# Patient Record
Sex: FEMALE | Race: WHITE | NOT HISPANIC OR LATINO
[De-identification: names, ages, dates, MRNs, and addresses within clinical notes are randomized per-mention and may not be internally consistent; named-entity substitution may affect disease eponyms.]

---

## 2015-05-15 RX ORDER — LISINOPRIL 2.5 MG/1
1 TABLET ORAL
Qty: 0 | Refills: 0 | COMMUNITY
Start: 2015-05-15

## 2017-05-01 ENCOUNTER — APPOINTMENT (OUTPATIENT)
Dept: ORTHOPEDIC SURGERY | Facility: CLINIC | Age: 72
End: 2017-05-01

## 2017-05-01 VITALS — DIASTOLIC BLOOD PRESSURE: 85 MMHG | SYSTOLIC BLOOD PRESSURE: 157 MMHG | HEART RATE: 58 BPM

## 2017-05-01 VITALS — HEART RATE: 76 BPM | DIASTOLIC BLOOD PRESSURE: 92 MMHG | SYSTOLIC BLOOD PRESSURE: 193 MMHG

## 2017-05-01 DIAGNOSIS — Z86.79 PERSONAL HISTORY OF OTHER DISEASES OF THE CIRCULATORY SYSTEM: ICD-10-CM

## 2017-05-01 DIAGNOSIS — E78.00 PURE HYPERCHOLESTEROLEMIA, UNSPECIFIED: ICD-10-CM

## 2017-05-01 DIAGNOSIS — Z87.39 PERSONAL HISTORY OF OTHER DISEASES OF THE MUSCULOSKELETAL SYSTEM AND CONNECTIVE TISSUE: ICD-10-CM

## 2017-05-01 RX ORDER — ALPRAZOLAM 2 MG/1
TABLET ORAL
Refills: 0 | Status: ACTIVE | COMMUNITY

## 2017-05-22 ENCOUNTER — APPOINTMENT (OUTPATIENT)
Dept: ORTHOPEDIC SURGERY | Facility: CLINIC | Age: 72
End: 2017-05-22

## 2017-05-22 VITALS
WEIGHT: 145 LBS | SYSTOLIC BLOOD PRESSURE: 165 MMHG | BODY MASS INDEX: 25.69 KG/M2 | HEIGHT: 63 IN | HEART RATE: 68 BPM | DIASTOLIC BLOOD PRESSURE: 95 MMHG

## 2017-06-12 ENCOUNTER — APPOINTMENT (OUTPATIENT)
Dept: ORTHOPEDIC SURGERY | Facility: CLINIC | Age: 72
End: 2017-06-12

## 2018-05-07 ENCOUNTER — EMERGENCY (EMERGENCY)
Facility: HOSPITAL | Age: 73
LOS: 1 days | Discharge: HOME | End: 2018-05-07
Attending: EMERGENCY MEDICINE | Admitting: EMERGENCY MEDICINE
Payer: MEDICARE

## 2018-05-07 VITALS
SYSTOLIC BLOOD PRESSURE: 131 MMHG | OXYGEN SATURATION: 99 % | RESPIRATION RATE: 18 BRPM | DIASTOLIC BLOOD PRESSURE: 77 MMHG | TEMPERATURE: 98 F | HEART RATE: 90 BPM | WEIGHT: 160.06 LBS

## 2018-05-07 DIAGNOSIS — Z98.89 OTHER SPECIFIED POSTPROCEDURAL STATES: Chronic | ICD-10-CM

## 2018-05-07 PROCEDURE — 99284 EMERGENCY DEPT VISIT MOD MDM: CPT | Mod: 25

## 2018-05-07 PROCEDURE — 70450 CT HEAD/BRAIN W/O DYE: CPT

## 2018-05-07 PROCEDURE — 70450 CT HEAD/BRAIN W/O DYE: CPT | Mod: 26

## 2018-05-07 PROCEDURE — 90715 TDAP VACCINE 7 YRS/> IM: CPT

## 2018-05-07 PROCEDURE — 12001 RPR S/N/AX/GEN/TRNK 2.5CM/<: CPT | Mod: GC

## 2018-05-07 PROCEDURE — 90471 IMMUNIZATION ADMIN: CPT

## 2018-05-07 PROCEDURE — 12001 RPR S/N/AX/GEN/TRNK 2.5CM/<: CPT

## 2018-05-07 RX ORDER — TETANUS TOXOID, REDUCED DIPHTHERIA TOXOID AND ACELLULAR PERTUSSIS VACCINE, ADSORBED 5; 2.5; 8; 8; 2.5 [IU]/.5ML; [IU]/.5ML; UG/.5ML; UG/.5ML; UG/.5ML
0.5 SUSPENSION INTRAMUSCULAR ONCE
Qty: 0 | Refills: 0 | Status: COMPLETED | OUTPATIENT
Start: 2018-05-07 | End: 2018-05-07

## 2018-05-07 RX ADMIN — TETANUS TOXOID, REDUCED DIPHTHERIA TOXOID AND ACELLULAR PERTUSSIS VACCINE, ADSORBED 0.5 MILLILITER(S): 5; 2.5; 8; 8; 2.5 SUSPENSION INTRAMUSCULAR at 12:07

## 2018-05-07 NOTE — ED ADULT NURSE NOTE - OBJECTIVE STATEMENT
Pt presented for eval of lac to right side of head after trip and fall, no loc.  Wound has been stapled by ED staff.

## 2018-05-07 NOTE — ED PROVIDER NOTE - MEDICAL DECISION MAKING DETAILS
Attending MD Terrazas: 72F with mechanical fall with head strike, +1cm laceration to right parietal scalp, Cervical spine cleared clinically of fracture without need for imaging according to Nexus Criteria, no other trauma. PLan for CT head to r/o ICH given advanced age, update tetanus and repair laceration of scalp primarily

## 2018-05-07 NOTE — ED PROCEDURE NOTE - ATTENDING CONTRIBUTION TO CARE
Attending MD Terrazas: Risks, benefit and alternatives of procedure explained to patient and patient demonstrated verbal understanding and consent.  I was present during the key portions of the procedure.

## 2018-05-07 NOTE — ED PROVIDER NOTE - OBJECTIVE STATEMENT
72F presenting with fall from standing, patient has a drop foot and states she falls on occasion. +head strike with no LOC. She denies any neck pain, back pain, chest pain or extremity pain. +lac to scalp that is oozing upon arrival. Unknown last tetanus

## 2018-05-07 NOTE — ED PROVIDER NOTE - PLAN OF CARE
You had ****** staples placed, this need to be removed in 7 days.    Keep the wound dry for 24 hours, then after this you may let water run over the wound but do not scrub it. Observe for signs of infection including spreading redness around the wound, fevers (temperature over 101F), or discharge of pus from the area. Return to the emergency department if these occur You had 2 staples placed, this need to be removed in 7 days.    Keep the wound dry for 24 hours, then after this you may let water run over the wound but do not scrub it. Observe for signs of infection including spreading redness around the wound, fevers (temperature over 101F), or discharge of pus from the area. Return to the emergency department if these occur

## 2018-05-07 NOTE — ED PROVIDER NOTE - CARE PLAN
Principal Discharge DX:	Scalp laceration, initial encounter  Assessment and plan of treatment:	You had ****** staples placed, this need to be removed in 7 days.    Keep the wound dry for 24 hours, then after this you may let water run over the wound but do not scrub it. Observe for signs of infection including spreading redness around the wound, fevers (temperature over 101F), or discharge of pus from the area. Return to the emergency department if these occur  Secondary Diagnosis:	Fall, initial encounter Principal Discharge DX:	Scalp laceration, initial encounter  Assessment and plan of treatment:	You had 2 staples placed, this need to be removed in 7 days.    Keep the wound dry for 24 hours, then after this you may let water run over the wound but do not scrub it. Observe for signs of infection including spreading redness around the wound, fevers (temperature over 101F), or discharge of pus from the area. Return to the emergency department if these occur  Secondary Diagnosis:	Fall, initial encounter

## 2018-05-07 NOTE — ED PROVIDER NOTE - PHYSICAL EXAMINATION
Attending MD Terrazas: A & O x 3, GCS 15, NAD, +1cm linear laceration to right parietal scalp, HEENT WNL and no facial asymmetry;  nontender spine; lungs CTAB with no chest wall trauma or TTP, heart with reg rhythm without murmur; abdomen soft NTND with no R/G; extremities with no edema/deformities; skin with no rashes, neuro exam non focal with no motor or sensory deficits and patient is moving all extremities spontaneously.

## 2018-05-25 ENCOUNTER — INPATIENT (INPATIENT)
Facility: HOSPITAL | Age: 73
LOS: 3 days | Discharge: HOME | DRG: 916 | End: 2018-05-29
Attending: INTERNAL MEDICINE | Admitting: INTERNAL MEDICINE
Payer: MEDICARE

## 2018-05-25 VITALS
OXYGEN SATURATION: 92 % | HEART RATE: 108 BPM | DIASTOLIC BLOOD PRESSURE: 72 MMHG | SYSTOLIC BLOOD PRESSURE: 122 MMHG | TEMPERATURE: 99 F | RESPIRATION RATE: 18 BRPM

## 2018-05-25 DIAGNOSIS — Z98.89 OTHER SPECIFIED POSTPROCEDURAL STATES: Chronic | ICD-10-CM

## 2018-05-25 DIAGNOSIS — N39.0 URINARY TRACT INFECTION, SITE NOT SPECIFIED: ICD-10-CM

## 2018-05-25 LAB
ALBUMIN SERPL ELPH-MCNC: 3.7 G/DL — SIGNIFICANT CHANGE UP (ref 3.3–5)
ALP SERPL-CCNC: 176 U/L — HIGH (ref 40–120)
ALT FLD-CCNC: 33 U/L — SIGNIFICANT CHANGE UP (ref 10–45)
ANION GAP SERPL CALC-SCNC: 14 MMOL/L — SIGNIFICANT CHANGE UP (ref 5–17)
APPEARANCE UR: ABNORMAL
AST SERPL-CCNC: 23 U/L — SIGNIFICANT CHANGE UP (ref 10–40)
BASE EXCESS BLDV CALC-SCNC: -1.8 MMOL/L — SIGNIFICANT CHANGE UP (ref -2–2)
BASOPHILS # BLD AUTO: 0 K/UL — SIGNIFICANT CHANGE UP (ref 0–0.2)
BASOPHILS NFR BLD AUTO: 0.2 % — SIGNIFICANT CHANGE UP (ref 0–2)
BILIRUB SERPL-MCNC: 0.6 MG/DL — SIGNIFICANT CHANGE UP (ref 0.2–1.2)
BILIRUB UR-MCNC: NEGATIVE — SIGNIFICANT CHANGE UP
BUN SERPL-MCNC: 34 MG/DL — HIGH (ref 7–23)
CA-I SERPL-SCNC: 1.21 MMOL/L — SIGNIFICANT CHANGE UP (ref 1.12–1.3)
CALCIUM SERPL-MCNC: 10.3 MG/DL — SIGNIFICANT CHANGE UP (ref 8.4–10.5)
CHLORIDE BLDV-SCNC: 104 MMOL/L — SIGNIFICANT CHANGE UP (ref 96–108)
CHLORIDE SERPL-SCNC: 97 MMOL/L — SIGNIFICANT CHANGE UP (ref 96–108)
CO2 BLDV-SCNC: 22 MMOL/L — SIGNIFICANT CHANGE UP (ref 22–30)
CO2 SERPL-SCNC: 21 MMOL/L — LOW (ref 22–31)
COLOR SPEC: YELLOW — SIGNIFICANT CHANGE UP
CREAT SERPL-MCNC: 0.93 MG/DL — SIGNIFICANT CHANGE UP (ref 0.5–1.3)
DIFF PNL FLD: NEGATIVE — SIGNIFICANT CHANGE UP
EOSINOPHIL # BLD AUTO: 0 K/UL — SIGNIFICANT CHANGE UP (ref 0–0.5)
EOSINOPHIL NFR BLD AUTO: 0.2 % — SIGNIFICANT CHANGE UP (ref 0–6)
EPI CELLS # UR: SIGNIFICANT CHANGE UP /HPF
GAS PNL BLDV: 127 MMOL/L — LOW (ref 136–145)
GAS PNL BLDV: SIGNIFICANT CHANGE UP
GAS PNL BLDV: SIGNIFICANT CHANGE UP
GLUCOSE BLDV-MCNC: 129 MG/DL — HIGH (ref 70–99)
GLUCOSE SERPL-MCNC: 125 MG/DL — HIGH (ref 70–99)
GLUCOSE UR QL: NEGATIVE — SIGNIFICANT CHANGE UP
HCO3 BLDV-SCNC: 21 MMOL/L — SIGNIFICANT CHANGE UP (ref 21–29)
HCT VFR BLD CALC: 42.8 % — SIGNIFICANT CHANGE UP (ref 34.5–45)
HCT VFR BLDA CALC: 42 % — SIGNIFICANT CHANGE UP (ref 39–50)
HGB BLD CALC-MCNC: 13.6 G/DL — SIGNIFICANT CHANGE UP (ref 11.5–15.5)
HGB BLD-MCNC: 14 G/DL — SIGNIFICANT CHANGE UP (ref 11.5–15.5)
KETONES UR-MCNC: NEGATIVE — SIGNIFICANT CHANGE UP
LACTATE BLDV-MCNC: 3 MMOL/L — HIGH (ref 0.7–2)
LEUKOCYTE ESTERASE UR-ACNC: ABNORMAL
LYMPHOCYTES # BLD AUTO: 1.7 K/UL — SIGNIFICANT CHANGE UP (ref 1–3.3)
LYMPHOCYTES # BLD AUTO: 11.3 % — LOW (ref 13–44)
MCHC RBC-ENTMCNC: 27.4 PG — SIGNIFICANT CHANGE UP (ref 27–34)
MCHC RBC-ENTMCNC: 32.6 GM/DL — SIGNIFICANT CHANGE UP (ref 32–36)
MCV RBC AUTO: 83.9 FL — SIGNIFICANT CHANGE UP (ref 80–100)
MONOCYTES # BLD AUTO: 0.7 K/UL — SIGNIFICANT CHANGE UP (ref 0–0.9)
MONOCYTES NFR BLD AUTO: 5.1 % — SIGNIFICANT CHANGE UP (ref 2–14)
NEUTROPHILS # BLD AUTO: 12.2 K/UL — HIGH (ref 1.8–7.4)
NEUTROPHILS NFR BLD AUTO: 83.2 % — HIGH (ref 43–77)
NITRITE UR-MCNC: NEGATIVE — SIGNIFICANT CHANGE UP
PCO2 BLDV: 31 MMHG — LOW (ref 35–50)
PH BLDV: 7.44 — SIGNIFICANT CHANGE UP (ref 7.35–7.45)
PH UR: 6.5 — SIGNIFICANT CHANGE UP (ref 5–8)
PLATELET # BLD AUTO: 378 K/UL — SIGNIFICANT CHANGE UP (ref 150–400)
PO2 BLDV: 41 MMHG — SIGNIFICANT CHANGE UP (ref 25–45)
POTASSIUM BLDV-SCNC: 4.9 MMOL/L — SIGNIFICANT CHANGE UP (ref 3.5–5)
POTASSIUM SERPL-MCNC: 4 MMOL/L — SIGNIFICANT CHANGE UP (ref 3.5–5.3)
POTASSIUM SERPL-SCNC: 4 MMOL/L — SIGNIFICANT CHANGE UP (ref 3.5–5.3)
PROT SERPL-MCNC: 6.4 G/DL — SIGNIFICANT CHANGE UP (ref 6–8.3)
PROT UR-MCNC: 100 MG/DL
RBC # BLD: 5.1 M/UL — SIGNIFICANT CHANGE UP (ref 3.8–5.2)
RBC # FLD: 12.1 % — SIGNIFICANT CHANGE UP (ref 10.3–14.5)
RBC CASTS # UR COMP ASSIST: SIGNIFICANT CHANGE UP /HPF (ref 0–2)
SAO2 % BLDV: 76 % — SIGNIFICANT CHANGE UP (ref 67–88)
SODIUM SERPL-SCNC: 132 MMOL/L — LOW (ref 135–145)
SP GR SPEC: >1.03 — HIGH (ref 1.01–1.02)
UROBILINOGEN FLD QL: NEGATIVE — SIGNIFICANT CHANGE UP
WBC # BLD: 14.6 K/UL — HIGH (ref 3.8–10.5)
WBC # FLD AUTO: 14.6 K/UL — HIGH (ref 3.8–10.5)
WBC UR QL: SIGNIFICANT CHANGE UP /HPF (ref 0–5)

## 2018-05-25 PROCEDURE — 93010 ELECTROCARDIOGRAM REPORT: CPT

## 2018-05-25 PROCEDURE — 99285 EMERGENCY DEPT VISIT HI MDM: CPT | Mod: 25,GC

## 2018-05-25 RX ORDER — LISINOPRIL 2.5 MG/1
20 TABLET ORAL
Qty: 0 | Refills: 0 | Status: DISCONTINUED | OUTPATIENT
Start: 2018-05-25 | End: 2018-05-26

## 2018-05-25 RX ORDER — CEFTRIAXONE 500 MG/1
1 INJECTION, POWDER, FOR SOLUTION INTRAMUSCULAR; INTRAVENOUS ONCE
Qty: 0 | Refills: 0 | Status: COMPLETED | OUTPATIENT
Start: 2018-05-25 | End: 2018-05-25

## 2018-05-25 RX ORDER — DILTIAZEM HCL 120 MG
300 CAPSULE, EXT RELEASE 24 HR ORAL DAILY
Qty: 0 | Refills: 0 | Status: DISCONTINUED | OUTPATIENT
Start: 2018-05-25 | End: 2018-05-28

## 2018-05-25 RX ORDER — DILTIAZEM HCL 120 MG
180 CAPSULE, EXT RELEASE 24 HR ORAL DAILY
Qty: 0 | Refills: 0 | Status: DISCONTINUED | OUTPATIENT
Start: 2018-05-25 | End: 2018-05-25

## 2018-05-25 RX ORDER — SODIUM CHLORIDE 9 MG/ML
1000 INJECTION INTRAMUSCULAR; INTRAVENOUS; SUBCUTANEOUS
Qty: 0 | Refills: 0 | Status: DISCONTINUED | OUTPATIENT
Start: 2018-05-25 | End: 2018-05-27

## 2018-05-25 RX ORDER — SODIUM CHLORIDE 9 MG/ML
2000 INJECTION INTRAMUSCULAR; INTRAVENOUS; SUBCUTANEOUS ONCE
Qty: 0 | Refills: 0 | Status: COMPLETED | OUTPATIENT
Start: 2018-05-25 | End: 2018-05-25

## 2018-05-25 RX ORDER — ALPRAZOLAM 0.25 MG
0.25 TABLET ORAL ONCE
Qty: 0 | Refills: 0 | Status: DISCONTINUED | OUTPATIENT
Start: 2018-05-25 | End: 2018-05-26

## 2018-05-25 RX ORDER — DIPHENHYDRAMINE HCL 50 MG
25 CAPSULE ORAL ONCE
Qty: 0 | Refills: 0 | Status: COMPLETED | OUTPATIENT
Start: 2018-05-25 | End: 2018-05-25

## 2018-05-25 RX ORDER — ISOSORBIDE MONONITRATE 60 MG/1
60 TABLET, EXTENDED RELEASE ORAL DAILY
Qty: 0 | Refills: 0 | Status: DISCONTINUED | OUTPATIENT
Start: 2018-05-25 | End: 2018-05-26

## 2018-05-25 RX ADMIN — CEFTRIAXONE 100 GRAM(S): 500 INJECTION, POWDER, FOR SOLUTION INTRAMUSCULAR; INTRAVENOUS at 23:47

## 2018-05-25 RX ADMIN — SODIUM CHLORIDE 70 MILLILITER(S): 9 INJECTION INTRAMUSCULAR; INTRAVENOUS; SUBCUTANEOUS at 22:31

## 2018-05-25 NOTE — ED PROVIDER NOTE - OBJECTIVE STATEMENT
73 year old female, past medical history HTN, anxiety, presents to the ED for fever. Patient reports that she woke up with rash on arms and hands, was pruritic, slight improvement with benadryl. Went to urgent care was noted to be febrile, and had positive UA. Was advised to come to the ED for further workup. Was also noted to be tachycardic. Patient now reports no pain, dysuria, frequency. No chest pain, shortness of breath, abdominal pain, back pain. No sick contacts or recent travel. No new medications.

## 2018-05-25 NOTE — H&P ADULT - ASSESSMENT
pt with known hx of htn, who presented with fever,chills,rash   r/o urosepsis  abx  dvt prophylaxis  possible allergy rx ?to meal last night no sign of anaphylaxis  benadryll,zantac  ivf  will follow up

## 2018-05-25 NOTE — ED PROVIDER NOTE - CHIEF COMPLAINT
The patient is a 73y Female complaining of urinary symptoms. The patient is a 73y Female complaining of fever.

## 2018-05-25 NOTE — ED ADULT NURSE NOTE - OBJECTIVE STATEMENT
Presents today for dx UTI/fever. Pt awoke with fever and itchy rash to arms and trunk. Pt went to urgent care and was diagnosed with UTI. Denies urinary symptoms. Pt A&Ox3. [place]. RUBIO. Ambulates with cane. Denies cp/sob. Respirations even/unlabored. Abd soft. No n/v/d. redness noted to bilat arms. +itching.

## 2018-05-25 NOTE — H&P ADULT - HISTORY OF PRESENT ILLNESS
CHIEF COMPLAINT:Patient is a 73y old  Female who presents with a chief complaint of fever,chill,rash.    HPI: pt is a 74 yo female with hx of htn, anxiety disorder who presented to er with c/o fever ,chills, rash starting 4 am .  pt seen in urgent care with positive u/a and fever of 102 and tachycardic.  no chest pain,sob,dizziness.      PAST MEDICAL & SURGICAL HISTORY:  Fall  Passed out  Fracture of femur  Hypertension  S/P ORIF (open reduction internal fixation) fracture      MEDICATIONS  (STANDING):    MEDICATIONS  (PRN):      FAMILY HISTORY:      SOCIAL HISTORY:    [ ] Non-smoker  [ ] Smoker  [ ] Alcohol    Allergies    hydrALAZINE (Rash)    Intolerances    	    REVIEW OF SYSTEMS:  CONSTITUTIONAL: No fever, weight loss, or fatigue  EYES: No eye pain, visual disturbances, or discharge, fever  ENT:  No difficulty hearing, tinnitus, vertigo; No sinus or throat pain  NECK: No pain or stiffness  RESPIRATORY: No cough, wheezing, chills or hemoptysis; No Shortness of Breath  CARDIOVASCULAR: No chest pain, + palpitations,no  passing out, dizziness, or leg swelling  GASTROINTESTINAL: No abdominal or epigastric pain. No nausea, vomiting, or hematemesis; No diarrhea or constipation. No melena or hematochezia.  GENITOURINARY: No dysuria, frequency, hematuria, or incontinence  NEUROLOGICAL: No headaches, memory loss, loss of strength, numbness, or tremors  SKIN: No itching, burning, rashes, or lesions   LYMPH Nodes: No enlarged glands  ENDOCRINE: No heat or cold intolerance; No hair loss  MUSCULOSKELETAL: No joint pain or swelling; No muscle, back, or extremity pain  PSYCHIATRIC: No depression, anxiety, mood swings, or difficulty sleeping  HEME/LYMPH: No easy bruising, or bleeding gums  ALLERGY AND IMMUNOLOGIC: No hives or eczema	    [ ] All others negative	  [ ] Unable to obtain    PHYSICAL EXAM:  T(C): 37.2 (05-25-18 @ 19:46), Max: 37.2 (05-25-18 @ 19:46)  HR: 108 (05-25-18 @ 19:46) (108 - 108)  BP: 122/72 (05-25-18 @ 19:46) (122/72 - 122/72)  RR: 18 (05-25-18 @ 19:46) (18 - 18)  SpO2: 92% (05-25-18 @ 19:46) (92% - 92%)  Wt(kg): --  I&O's Summary      Appearance: Normal	  HEENT:   Normal oral mucosa, PERRL, EOMI, + rash	  Lymphatic: No lymphadenopathy  Cardiovascular: Normal S1 S2, No JVD, +murmurs, No edema  Respiratory: Lungs clear to auscultation	  Psychiatry: A & O x 3, Mood & affect appropriate  Gastrointestinal:  Soft, Non-tender, + BS	  Skin: No rashes, No ecchymoses, No cyanosis	  Neurologic: Non-focal  Extremities: Normal range of motion, No clubbing, cyanosis or edema  Vascular: Peripheral pulses palpable 2+ bilaterally    TELEMETRY: 	    ECG:  	  RADIOLOGY:  OTHER: 	  	  LABS:	 	    CARDIAC MARKERS:                proBNP:   Lipid Profile:   HgA1c:   TSH:       PREVIOUS DIAGNOSTIC TESTING:    [ ] Echocardiogram:  [ ]  Catheterization:  [ ] Stress Test:

## 2018-05-25 NOTE — ED PROVIDER NOTE - ATTENDING CONTRIBUTION TO CARE
pt is a 72 yo female with hx of htn, anxiety disorder who presented to er with c/o fever ,chills, rash starting 4 am .  pt seen in urgent care with positive u/a and fever of 102 and tachycardic. vss to work up for urosepsis, ivf, labs, abx.

## 2018-05-26 LAB
ANION GAP SERPL CALC-SCNC: 12 MMOL/L — SIGNIFICANT CHANGE UP (ref 5–17)
BUN SERPL-MCNC: 27 MG/DL — HIGH (ref 7–23)
CALCIUM SERPL-MCNC: 8.7 MG/DL — SIGNIFICANT CHANGE UP (ref 8.4–10.5)
CHLORIDE SERPL-SCNC: 104 MMOL/L — SIGNIFICANT CHANGE UP (ref 96–108)
CO2 SERPL-SCNC: 19 MMOL/L — LOW (ref 22–31)
CREAT SERPL-MCNC: 0.75 MG/DL — SIGNIFICANT CHANGE UP (ref 0.5–1.3)
CULTURE RESULTS: SIGNIFICANT CHANGE UP
GLUCOSE SERPL-MCNC: 125 MG/DL — HIGH (ref 70–99)
HCT VFR BLD CALC: 38.5 % — SIGNIFICANT CHANGE UP (ref 34.5–45)
HGB BLD-MCNC: 12.4 G/DL — SIGNIFICANT CHANGE UP (ref 11.5–15.5)
MCHC RBC-ENTMCNC: 26.8 PG — LOW (ref 27–34)
MCHC RBC-ENTMCNC: 32.2 GM/DL — SIGNIFICANT CHANGE UP (ref 32–36)
MCV RBC AUTO: 83.3 FL — SIGNIFICANT CHANGE UP (ref 80–100)
PLATELET # BLD AUTO: 323 K/UL — SIGNIFICANT CHANGE UP (ref 150–400)
POTASSIUM SERPL-MCNC: 4.2 MMOL/L — SIGNIFICANT CHANGE UP (ref 3.5–5.3)
POTASSIUM SERPL-SCNC: 4.2 MMOL/L — SIGNIFICANT CHANGE UP (ref 3.5–5.3)
RAPID RVP RESULT: SIGNIFICANT CHANGE UP
RBC # BLD: 4.62 M/UL — SIGNIFICANT CHANGE UP (ref 3.8–5.2)
RBC # FLD: 13.8 % — SIGNIFICANT CHANGE UP (ref 10.3–14.5)
SODIUM SERPL-SCNC: 135 MMOL/L — SIGNIFICANT CHANGE UP (ref 135–145)
SPECIMEN SOURCE: SIGNIFICANT CHANGE UP
TSH SERPL-MCNC: 2.59 UIU/ML — SIGNIFICANT CHANGE UP (ref 0.27–4.2)
WBC # BLD: 10.99 K/UL — HIGH (ref 3.8–10.5)
WBC # FLD AUTO: 10.99 K/UL — HIGH (ref 3.8–10.5)

## 2018-05-26 PROCEDURE — 99223 1ST HOSP IP/OBS HIGH 75: CPT

## 2018-05-26 RX ORDER — ONDANSETRON 8 MG/1
4 TABLET, FILM COATED ORAL ONCE
Qty: 0 | Refills: 0 | Status: DISCONTINUED | OUTPATIENT
Start: 2018-05-26 | End: 2018-05-29

## 2018-05-26 RX ORDER — HYDROCORTISONE 20 MG
50 TABLET ORAL
Qty: 0 | Refills: 0 | Status: DISCONTINUED | OUTPATIENT
Start: 2018-05-26 | End: 2018-05-29

## 2018-05-26 RX ORDER — OXYCODONE AND ACETAMINOPHEN 5; 325 MG/1; MG/1
1 TABLET ORAL EVERY 12 HOURS
Qty: 0 | Refills: 0 | Status: DISCONTINUED | OUTPATIENT
Start: 2018-05-26 | End: 2018-05-29

## 2018-05-26 RX ORDER — HYDROXYZINE HCL 10 MG
25 TABLET ORAL
Qty: 0 | Refills: 0 | Status: DISCONTINUED | OUTPATIENT
Start: 2018-05-26 | End: 2018-05-26

## 2018-05-26 RX ORDER — HYDROXYZINE HCL 10 MG
25 TABLET ORAL ONCE
Qty: 0 | Refills: 0 | Status: COMPLETED | OUTPATIENT
Start: 2018-05-26 | End: 2018-05-26

## 2018-05-26 RX ORDER — CEFTRIAXONE 500 MG/1
1 INJECTION, POWDER, FOR SOLUTION INTRAMUSCULAR; INTRAVENOUS EVERY 24 HOURS
Qty: 0 | Refills: 0 | Status: DISCONTINUED | OUTPATIENT
Start: 2018-05-26 | End: 2018-05-26

## 2018-05-26 RX ORDER — FLUOCINONIDE/EMOLLIENT BASE 0.05 %
1 CREAM (GRAM) TOPICAL
Qty: 0 | Refills: 0 | Status: DISCONTINUED | OUTPATIENT
Start: 2018-05-26 | End: 2018-05-29

## 2018-05-26 RX ORDER — HYDROXYZINE HCL 10 MG
25 TABLET ORAL AT BEDTIME
Qty: 0 | Refills: 0 | Status: DISCONTINUED | OUTPATIENT
Start: 2018-05-26 | End: 2018-05-29

## 2018-05-26 RX ORDER — LISINOPRIL 2.5 MG/1
10 TABLET ORAL AT BEDTIME
Qty: 0 | Refills: 0 | Status: DISCONTINUED | OUTPATIENT
Start: 2018-05-26 | End: 2018-05-26

## 2018-05-26 RX ORDER — LORATADINE 10 MG/1
10 TABLET ORAL ONCE
Qty: 0 | Refills: 0 | Status: COMPLETED | OUTPATIENT
Start: 2018-05-26 | End: 2018-05-26

## 2018-05-26 RX ORDER — LISINOPRIL 2.5 MG/1
20 TABLET ORAL DAILY
Qty: 0 | Refills: 0 | Status: DISCONTINUED | OUTPATIENT
Start: 2018-05-26 | End: 2018-05-26

## 2018-05-26 RX ORDER — ENOXAPARIN SODIUM 100 MG/ML
40 INJECTION SUBCUTANEOUS DAILY
Qty: 0 | Refills: 0 | Status: DISCONTINUED | OUTPATIENT
Start: 2018-05-26 | End: 2018-05-29

## 2018-05-26 RX ORDER — DIPHENHYDRAMINE HCL 50 MG
25 CAPSULE ORAL ONCE
Qty: 0 | Refills: 0 | Status: COMPLETED | OUTPATIENT
Start: 2018-05-26 | End: 2018-05-26

## 2018-05-26 RX ORDER — ACETAMINOPHEN 500 MG
650 TABLET ORAL EVERY 6 HOURS
Qty: 0 | Refills: 0 | Status: DISCONTINUED | OUTPATIENT
Start: 2018-05-26 | End: 2018-05-29

## 2018-05-26 RX ORDER — LORATADINE 10 MG/1
10 TABLET ORAL
Qty: 0 | Refills: 0 | Status: DISCONTINUED | OUTPATIENT
Start: 2018-05-26 | End: 2018-05-29

## 2018-05-26 RX ADMIN — OXYCODONE AND ACETAMINOPHEN 1 TABLET(S): 5; 325 TABLET ORAL at 19:24

## 2018-05-26 RX ADMIN — Medication 1 APPLICATION(S): at 17:16

## 2018-05-26 RX ADMIN — Medication 300 MILLIGRAM(S): at 11:35

## 2018-05-26 RX ADMIN — LISINOPRIL 20 MILLIGRAM(S): 2.5 TABLET ORAL at 05:57

## 2018-05-26 RX ADMIN — OXYCODONE AND ACETAMINOPHEN 1 TABLET(S): 5; 325 TABLET ORAL at 17:20

## 2018-05-26 RX ADMIN — LORATADINE 10 MILLIGRAM(S): 10 TABLET ORAL at 17:13

## 2018-05-26 RX ADMIN — Medication 650 MILLIGRAM(S): at 17:51

## 2018-05-26 RX ADMIN — Medication 25 MILLIGRAM(S): at 01:00

## 2018-05-26 RX ADMIN — Medication 25 MILLIGRAM(S): at 11:31

## 2018-05-26 RX ADMIN — SODIUM CHLORIDE 2000 MILLILITER(S): 9 INJECTION INTRAMUSCULAR; INTRAVENOUS; SUBCUTANEOUS at 00:58

## 2018-05-26 RX ADMIN — Medication 0.25 MILLIGRAM(S): at 11:38

## 2018-05-26 RX ADMIN — ENOXAPARIN SODIUM 40 MILLIGRAM(S): 100 INJECTION SUBCUTANEOUS at 11:38

## 2018-05-26 RX ADMIN — Medication 25 MILLIGRAM(S): at 22:39

## 2018-05-26 RX ADMIN — Medication 50 MILLIGRAM(S): at 17:10

## 2018-05-26 RX ADMIN — SODIUM CHLORIDE 100 MILLILITER(S): 9 INJECTION INTRAMUSCULAR; INTRAVENOUS; SUBCUTANEOUS at 22:39

## 2018-05-26 NOTE — CONSULT NOTE ADULT - SUBJECTIVE AND OBJECTIVE BOX
International Travel? No(1)    73y Female complaining of fever.	  	   73 year ,  HTN, anxiety, hip  surg,  falls,, hyponatremia presents to the ED for fever. Patient reports that she woke up with rash on arms and hands, was pruritic, slight improvement with benadryl. Went to urgent care was noted to be febrile, and had positive UA. Was advised to come to the ED for further workup. Was also noted to be tachycardic. Patient now reports no pain, dysuria, frequency. No chest pain, shortness of breath, abdominal pain, back pain. No sick contacts or recent travel. No new medication	     HPI:  CHIEF COMPLAINT:Patient is a 73y old  Female who presents with a chief complaint of fever,chill,rash.    PAST MEDICAL & SURGICAL HISTORY:  Fall  Passed out  Fracture of femur  Hypertension  S/P ORIF (open reduction internal fixation) fracture      MEDICATIONS  (STANDING):    MEDICATIONS  (PRN):      FAMILY HISTORY:      SOCIAL HISTORY:    [ ] Non-smoker  [ ] Smoker  [ ] Alcohol    Allergies    hydrALAZINE (Rash)    Intolerances    	    REVIEW OF SYSTEMS:  CONSTITUTIONAL: fever,  no  weight loss, or fatigue  EYES: No eye pain, visual disturbances, or discharge, fever  ENT:  No difficulty hearing, tinnitus, vertigo; No sinus or throat pain  NECK: No pain or stiffness  RESPIRATORY: No cough, wheezing, chills or hemoptysis; No Shortness of Breath  CARDIOVASCULAR: No chest pain, + palpitations,no  passing out, dizziness, or leg swelling  GASTROINTESTINAL: No abdominal or epigastric pain. No nausea, vomiting, or hematemesis; No diarrhea or constipation. No melena or hematochezia.  GENITOURINARY: No dysuria, frequency, hematuria, or incontinence  NEUROLOGICAL: No headaches, memory loss, loss of strength, numbness, or tremors  SKIN: No itching, burning, rashes, or lesions   LYMPH Nodes: No enlarged glands  ENDOCRINE: No heat or cold intolerance; No hair loss  MUSCULOSKELETAL: No joint pain or swelling; No muscle, back, or extremity pain  PSYCHIATRIC: No depression, anxiety, mood swings, or difficulty sleeping  HEME/LYMPH: No easy bruising, or bleeding gums  ALLERGY AND IMMUNOLOGIC: No hives or eczema	    [ ] All others negative	  [ ] Unable to obtain    PHYSICAL EXAM:  T(C): 37.2 (05-25-18 @ 19:46), Max: 37.2 (05-25-18 @ 19:46)  HR: 108 (05-25-18 @ 19:46) (108 - 108)  BP: 122/72 (05-25-18 @ 19:46) (122/72 - 122/72)  RR: 18 (05-25-18 @ 19:46) (18 - 18)  SpO2: 92% (05-25-18 @ 19:46) (92% - 92%)  Wt(kg): --  I&O's Summary      Appearance: Normal	  HEENT:   Normal oral mucosa, PERRL, EOMI, + rash	  Lymphatic: No lymphadenopathy  Cardiovascular: Normal S1 S2, No JVD, +murmurs, No edema  Respiratory: Lungs clear to auscultation	  Psychiatry: A & O x 3, Mood & affect appropriate  Gastrointestinal:  Soft, Non-tender, + BS	  Skin: No rashes, No ecchymoses, No cyanosis	  Neurologic: Non-focal  Extremities: Normal range of motion, No clubbing, cyanosis or edema  Vascular: Peripheral pulses palpable 2+ bilaterally    TELEMETRY: 	    ECG:  	  RADIOLOGY:  OTHER: 	  	  LABS:	 	    CARDIAC MARKERS:                proBNP:   Lipid Profile:   HgA1c:   TSH:       PREVIOUS DIAGNOSTIC TESTING:    [ ] Echocardiogram:  [ ]  Catheterization:  [ ] Stress Test: (25 May 2018 21:15)      REVIEW OF SYSTEMS:  CONSTITUTIONAL: No weakness,  no   fevers      RESPIRATORY:  No    shortness of breath  , no  wheezing  CARDIOVASCULAR: No chest pain,   no  palpitations, no  cough  GASTROINTESTINAL: No abdominal  pain, no  vomiting, no  diarrhea  NEUROLOGICAL: No  focal  weakness    Allergies    hydrALAZINE (Rash)  Lexapro (Other)    Intolerances        CAPILLARY BLOOD GLUCOSE        I&O's Summary    25 May 2018 07:01  -  26 May 2018 07:00  --------------------------------------------------------  IN: 350 mL / OUT: 0 mL / NET: 350 mL        Vital Signs Last 24 Hrs  T(C): 37.2 (26 May 2018 05:46), Max: 37.2 (25 May 2018 19:46)  T(F): 98.9 (26 May 2018 05:46), Max: 98.9 (25 May 2018 19:46)  HR: 118 (26 May 2018 05:46) (101 - 118)  BP: 107/52 (26 May 2018 05:46) (101/67 - 137/72)  BP(mean): --  RR: 20 (26 May 2018 05:46) (18 - 20)  SpO2: 95% (26 May 2018 05:46) (92% - 97%)  PHYSICAL EXAM:  GENERAL: NAD,   HEAD:  Atraumatic, Normocephalic  EYES: EOMI,  NECK: Supple, No JVD  CHEST/LUNG: Clear to auscultation bilaterally; No wheeze  HEART: Regular rate and rhythm;        murmur  ABDOMEN: Soft, Nontender,   EXTREMITIES:         edema  NEUROLOGY:  alert    MEDICATIONS  (STANDING):  diltiazem    milliGRAM(s) Oral daily  isosorbide   mononitrate ER Tablet (IMDUR) 60 milliGRAM(s) Oral daily  lisinopril 20 milliGRAM(s) Oral two times a day  ondansetron Injectable 4 milliGRAM(s) IV Push once  sodium chloride 0.9%. 1000 milliLiter(s) (70 mL/Hr) IV Continuous <Continuous>    MEDICATIONS  (PRN):  ALPRAZolam 0.25 milliGRAM(s) Oral Once PRN anxiety    LABS:                        14.0   14.6  )-----------( 378      ( 25 May 2018 22:36 )             42.8     05-25    132<L>  |  97  |  34<H>  ----------------------------<  125<H>  4.0   |  21<L>  |  0.93    Ca    10.3      25 May 2018 22:36    TPro  6.4  /  Alb  3.7  /  TBili  0.6  /  DBili  x   /  AST  23  /  ALT  33  /  AlkPhos  176<H>  05-25          Urinalysis Basic - ( 25 May 2018 22:15 )    Color: Yellow / Appearance: SL Turbid / SG: >1.030 / pH: x  Gluc: x / Ketone: Negative  / Bili: Negative / Urobili: Negative   Blood: x / Protein: 100 mg/dL / Nitrite: Negative   Leuk Esterase: Moderate / RBC: 0-2 /HPF / WBC 6-10 /HPF   Sq Epi: x / Non Sq Epi: OCC /HPF / Bacteria: x          05-25 @ 22:36  4.9  41      Thyroid Stimulating Hormone, Serum: 2.59 uIU/mL (05-26 @ 04:33)          Consultant(s) Notes Reviewed:      Care Discussed with Consultants/Other Providers:  Plan: International Travel? No(1)    73y Female complaining of fever.	  	   73 year ,  HTN, anxiety, hip  surg,  falls,, hyponatremia presents to the ED for fever/  rash. Patient reports that she woke up with rash on arms and hands, was pruritic, slight improvement with benadryl. Went to urgent care was noted to be febrile, and had positive UA. Was advised to come to the ED for further workup. Was also noted to be tachycardic. Patient now reports no pain, dysuria, frequency. No chest pain, shortness of breath, abdominal pain, back pain. No sick contacts or recent travel. No new medication	     HPI:  CHIEF COMPLAINT:Patient is a 73y old  Female who presents with a chief complaint of fever,chill,rash.    PAST MEDICAL & SURGICAL HISTORY:  Fall  Passed out  Fracture of femur  Hypertension  S/P ORIF (open reduction internal fixation) fracture      MEDICATIONS  (STANDING):    MEDICATIONS  (PRN):      FAMILY HISTORY:      SOCIAL HISTORY:    [ ] Non-smoker  [ ] Smoker  [ ] Alcohol    Allergies    hydrALAZINE (Rash)    Intolerances    	    REVIEW OF SYSTEMS:  CONSTITUTIONAL: fever,  no  weight loss, or fatigue  EYES: No eye pain, visual disturbances, or discharge, fever  ENT:  No difficulty hearing, tinnitus, vertigo; No sinus or throat pain  NECK: No pain or stiffness  RESPIRATORY: No cough, wheezing, chills or hemoptysis; No Shortness of Breath  CARDIOVASCULAR: No chest pain, + palpitations,no  passing out, dizziness, or leg swelling  GASTROINTESTINAL: No abdominal or epigastric pain. No nausea, vomiting, or hematemesis; No diarrhea or constipation. No melena or hematochezia.  GENITOURINARY: No dysuria, frequency, hematuria, or incontinence  NEUROLOGICAL: No headaches, memory loss, loss of strength, numbness, or tremors  SKIN: No itching, burning, rashes, or lesions   LYMPH Nodes: No enlarged glands  ENDOCRINE: No heat or cold intolerance; No hair loss  MUSCULOSKELETAL: No joint pain or swelling; No muscle, back, or extremity pain  PSYCHIATRIC: No depression, anxiety, mood swings, or difficulty sleeping  HEME/LYMPH: No easy bruising, or bleeding gums  ALLERGY AND IMMUNOLOGIC: No hives or eczema	    [ ] All others negative	  [ ] Unable to obtain    PHYSICAL EXAM:  T(C): 37.2 (05-25-18 @ 19:46), Max: 37.2 (05-25-18 @ 19:46)  HR: 108 (05-25-18 @ 19:46) (108 - 108)  BP: 122/72 (05-25-18 @ 19:46) (122/72 - 122/72)  RR: 18 (05-25-18 @ 19:46) (18 - 18)  SpO2: 92% (05-25-18 @ 19:46) (92% - 92%)  Wt(kg): --  I&O's Summary      Appearance: Normal	  HEENT:   Normal oral mucosa, PERRL, EOMI, + rash	  Lymphatic: No lymphadenopathy  Cardiovascular: Normal S1 S2, No JVD, +murmurs, No edema  Respiratory: Lungs clear to auscultation	  Psychiatry: A & O x 3, Mood & affect appropriate  Gastrointestinal:  Soft, Non-tender, + BS	  Skin: No rashes, No ecchymoses, No cyanosis	  Neurologic: Non-focal,   skin,  maculo papular rash, itchinmh  Extremities: Normal range of motion, No clubbing, cyanosis or edema  Vascular: Peripheral pulses palpable 2+ bilaterally    TELEMETRY: 	    ECG:  	  RADIOLOGY:  OTHER: 	  	  LABS:	 	    CARDIAC MARKERS:                proBNP:   Lipid Profile:   HgA1c:   TSH:       PREVIOUS DIAGNOSTIC TESTING:    [ ] Echocardiogram:  [ ]  Catheterization:  [ ] Stress Test: (25 May 2018 21:15)      REVIEW OF SYSTEMS:  CONSTITUTIONAL: No weakness,  no   fevers      RESPIRATORY:  No    shortness of breath  , no  wheezing  CARDIOVASCULAR: No chest pain,   no  palpitations, no  cough  GASTROINTESTINAL: No abdominal  pain, no  vomiting, no  diarrhea  NEUROLOGICAL: No  focal  weakness    Allergies    hydrALAZINE (Rash)  Lexapro (Other)    Intolerances        CAPILLARY BLOOD GLUCOSE        I&O's Summary    25 May 2018 07:01  -  26 May 2018 07:00  --------------------------------------------------------  IN: 350 mL / OUT: 0 mL / NET: 350 mL        Vital Signs Last 24 Hrs  T(C): 37.2 (26 May 2018 05:46), Max: 37.2 (25 May 2018 19:46)  T(F): 98.9 (26 May 2018 05:46), Max: 98.9 (25 May 2018 19:46)  HR: 118 (26 May 2018 05:46) (101 - 118)  BP: 107/52 (26 May 2018 05:46) (101/67 - 137/72)  BP(mean): --  RR: 20 (26 May 2018 05:46) (18 - 20)  SpO2: 95% (26 May 2018 05:46) (92% - 97%)  PHYSICAL EXAM:  GENERAL: NAD,   HEAD:  Atraumatic, Normocephalic  EYES: EOMI,  NECK: Supple, No JVD  CHEST/LUNG: Clear to auscultation bilaterally; No wheeze  HEART: Regular rate and rhythm;        murmur  ABDOMEN: Soft, Nontender,   EXTREMITIES:         edema  NEUROLOGY:  alert    MEDICATIONS  (STANDING):  diltiazem    milliGRAM(s) Oral daily  isosorbide   mononitrate ER Tablet (IMDUR) 60 milliGRAM(s) Oral daily  lisinopril 20 milliGRAM(s) Oral two times a day  ondansetron Injectable 4 milliGRAM(s) IV Push once  sodium chloride 0.9%. 1000 milliLiter(s) (70 mL/Hr) IV Continuous <Continuous>    MEDICATIONS  (PRN):  ALPRAZolam 0.25 milliGRAM(s) Oral Once PRN anxiety    LABS:                        14.0   14.6  )-----------( 378      ( 25 May 2018 22:36 )             42.8     05-25    132<L>  |  97  |  34<H>  ----------------------------<  125<H>  4.0   |  21<L>  |  0.93    Ca    10.3      25 May 2018 22:36    TPro  6.4  /  Alb  3.7  /  TBili  0.6  /  DBili  x   /  AST  23  /  ALT  33  /  AlkPhos  176<H>  05-25          Urinalysis Basic - ( 25 May 2018 22:15 )    Color: Yellow / Appearance: SL Turbid / SG: >1.030 / pH: x  Gluc: x / Ketone: Negative  / Bili: Negative / Urobili: Negative   Blood: x / Protein: 100 mg/dL / Nitrite: Negative   Leuk Esterase: Moderate / RBC: 0-2 /HPF / WBC 6-10 /HPF   Sq Epi: x / Non Sq Epi: OCC /HPF / Bacteria: x          05-25 @ 22:36  4.9  41      Thyroid Stimulating Hormone, Serum: 2.59 uIU/mL (05-26 @ 04:33)          Consultant(s) Notes Reviewed:      Care Discussed with Consultants/Other Providers:  Plan:

## 2018-05-26 NOTE — CHART NOTE - NSCHARTNOTEFT_GEN_A_CORE
Medicine NP Note     AKBAR CARLOS  MRN-[id number]  Allergies    hydrALAZINE (Rash)  Lexapro (Other)    Intolerances         Called to evaluate patient for     Vital Signs Last 24 Hrs  T(C): 37.6 (05-26-18 @ 11:03), Max: 37.6 (05-26-18 @ 11:03)  T(F): 99.6 (05-26-18 @ 11:03), Max: 99.6 (05-26-18 @ 11:03)  HR: 112 (05-26-18 @ 11:03) (101 - 118)  BP: 96/58 (05-26-18 @ 11:03) (96/58 - 137/72)  BP(mean): --  RR: 20 (05-26-18 @ 11:03) (18 - 20)  SpO2: 94% (05-26-18 @ 11:03) (92% - 97%)  Daily Height in cm: 160.02 (26 May 2018 01:58)    Daily   I&O's Summary    25 May 2018 07:01  -  26 May 2018 07:00  --------------------------------------------------------  IN: 350 mL / OUT: 0 mL / NET: 350 mL    26 May 2018 07:01  -  26 May 2018 17:58  --------------------------------------------------------  IN: 240 mL / OUT: 0 mL / NET: 240 mL      CAPILLARY BLOOD GLUCOSE                          12.4   10.99 )-----------( 323      ( 26 May 2018 09:05 )             38.5     05-26    135  |  104  |  27<H>  ----------------------------<  125<H>  4.2   |  19<L>  |  0.75    Ca    8.7      26 May 2018 07:03    TPro  6.4  /  Alb  3.7  /  TBili  0.6  /  DBili  x   /  AST  23  /  ALT  33  /  AlkPhos  176<H>  05-25              Radiology:    PHYSICAL EXAM:  GENERAL: NAD, well-developed  HEAD:  Atraumatic, Normocephalic  EYES: EOMI, PERRLA, conjunctiva and sclera clear  NECK: Supple, No JVD  CHEST/LUNG: Clear to auscultation bilaterally; No wheeze  HEART: Regular rate and rhythm; No murmurs, rubs, or gallops  ABDOMEN: Soft, Nontender, Nondistended; Bowel sounds present  EXTREMITIES:  2+ Peripheral Pulses, No clubbing, cyanosis, or edema  PSYCH: AAOx3  NEUROLOGY: non-focal  SKIN: No rashes or lesions    Assessment/Plan: HPI:  CHIEF COMPLAINT:Patient is a 73y old  Female who presents with a chief complaint of fever,chill,rash.    HPI: pt is a 72 yo female with hx of htn, anxiety disorder who presented to er with c/o fever ,chills, rash starting 4 am .  pt seen in urgent care with positive u/a and fever of 102 and tachycardic.  no chest pain,sob,dizziness.      PAST MEDICAL & SURGICAL HISTORY:  Fall  Passed out  Fracture of femur  Hypertension  S/P ORIF (open reduction internal fixation) fracture      MEDICATIONS  (STANDING):    MEDICATIONS  (PRN):      FAMILY HISTORY:      SOCIAL HISTORY:    [ ] Non-smoker  [ ] Smoker  [ ] Alcohol    Allergies    hydrALAZINE (Rash)    Intolerances    	    REVIEW OF SYSTEMS:  CONSTITUTIONAL: No fever, weight loss, or fatigue  EYES: No eye pain, visual disturbances, or discharge, fever  ENT:  No difficulty hearing, tinnitus, vertigo; No sinus or throat pain  NECK: No pain or stiffness  RESPIRATORY: No cough, wheezing, chills or hemoptysis; No Shortness of Breath  CARDIOVASCULAR: No chest pain, + palpitations,no  passing out, dizziness, or leg swelling  GASTROINTESTINAL: No abdominal or epigastric pain. No nausea, vomiting, or hematemesis; No diarrhea or constipation. No melena or hematochezia.  GENITOURINARY: No dysuria, frequency, hematuria, or incontinence  NEUROLOGICAL: No headaches, memory loss, loss of strength, numbness, or tremors  SKIN: No itching, burning, rashes, or lesions   LYMPH Nodes: No enlarged glands  ENDOCRINE: No heat or cold intolerance; No hair loss  MUSCULOSKELETAL: No joint pain or swelling; No muscle, back, or extremity pain  PSYCHIATRIC: No depression, anxiety, mood swings, or difficulty sleeping  HEME/LYMPH: No easy bruising, or bleeding gums  ALLERGY AND IMMUNOLOGIC: No hives or eczema	    [ ] All others negative	  [ ] Unable to obtain    PHYSICAL EXAM:  T(C): 37.2 (05-25-18 @ 19:46), Max: 37.2 (05-25-18 @ 19:46)  HR: 108 (05-25-18 @ 19:46) (108 - 108)  BP: 122/72 (05-25-18 @ 19:46) (122/72 - 122/72)  RR: 18 (05-25-18 @ 19:46) (18 - 18)  SpO2: 92% (05-25-18 @ 19:46) (92% - 92%)  Wt(kg): --  I&O's Summary      Appearance: Normal	  HEENT:   Normal oral mucosa, PERRL, EOMI, + rash	  Lymphatic: No lymphadenopathy  Cardiovascular: Normal S1 S2, No JVD, +murmurs, No edema  Respiratory: Lungs clear to auscultation	  Psychiatry: A & O x 3, Mood & affect appropriate  Gastrointestinal:  Soft, Non-tender, + BS	  Skin: No rashes, No ecchymoses, No cyanosis	  Neurologic: Non-focal  Extremities: Normal range of motion, No clubbing, cyanosis or edema  Vascular: Peripheral pulses palpable 2+ bilaterally    TELEMETRY: 	    ECG:  	  RADIOLOGY:  OTHER: 	  	  LABS:	 	    CARDIAC MARKERS:                proBNP:   Lipid Profile:   HgA1c:   TSH:       PREVIOUS DIAGNOSTIC TESTING:    [ ] Echocardiogram:  [ ]  Catheterization:  [ ] Stress Test: (25 May 2018 21:15) Medicine NP Note     AKBAR CARLOS  MRN-[id number]  Allergies    hydrALAZINE (Rash)  Lexapro (Other)    Intolerances     Called to evaluate patient for fever 101.3 this evening. Pt without new complaints. Urticarial rash to body reportedly the same as before. Denies cp, SOB, swelling to tongue or throat, abdominal pain, diarrhea or dysuria.      Vital Signs Last 24 Hrs  T(C): 37.6 (05-26-18 @ 11:03), Max: 37.6 (05-26-18 @ 11:03)  T(F): 99.6 (05-26-18 @ 11:03), Max: 99.6 (05-26-18 @ 11:03)  HR: 112 (05-26-18 @ 11:03) (101 - 118)  BP: 96/58 (05-26-18 @ 11:03) (96/58 - 137/72)  BP(mean): --  RR: 20 (05-26-18 @ 11:03) (18 - 20)  SpO2: 94% (05-26-18 @ 11:03) (92% - 97%)  Daily Height in cm: 160.02 (26 May 2018 01:58)    Daily   I&O's Summary    25 May 2018 07:01  -  26 May 2018 07:00  --------------------------------------------------------  IN: 350 mL / OUT: 0 mL / NET: 350 mL    26 May 2018 07:01  -  26 May 2018 17:58  --------------------------------------------------------  IN: 240 mL / OUT: 0 mL / NET: 240 mL                            12.4   10.99 )-----------( 323      ( 26 May 2018 09:05 )             38.5     05-26    135  |  104  |  27<H>  ----------------------------<  125<H>  4.2   |  19<L>  |  0.75    Ca    8.7      26 May 2018 07:03    TPro  6.4  /  Alb  3.7  /  TBili  0.6  /  DBili  x   /  AST  23  /  ALT  33  /  AlkPhos  176<H>  05-25          PHYSICAL EXAM:  GENERAL: NAD  HEAD:  swollen eyes and upper lip  CHEST/LUNG: Clear to auscultation bilaterally; No wheeze  HEART: Regular rate and rhythm;   ABDOMEN: Soft, Nontender, Nondistended; Bowel sounds present  EXTREMITIES:  no edema  PSYCH: AAOx3  SKIN: hives to entire body     Assessment/Plan:    HPI: pt is a 72 yo female with hx of htn, anxiety, recent UTI disorder who presented to er with c/o fever ,chills, rash evaluated by Dermatology and ID, reactive hypersensitivity reaction, drug reaction or a viral exanthem  1. Fever- Discussed with Dr. Arreola ID, cultures pending from 5/25/18. No antibiotics at this time. Tylenol for fever. IF fever persists, Dr. Arreola recommends to start Vanco 1gram Q12 hours and Cefepime 1 gram Q12 hours empirically. Continue symptomatic management for now. ID to follow up in am.

## 2018-05-26 NOTE — CONSULT NOTE ADULT - SUBJECTIVE AND OBJECTIVE BOX
HPI:   Patient is a 73y female with h hx of htn, anxiety disorder who presented to er with c/o fever ,chills, rash starting 4 am .  pt seen in urgent care with positive u/a and fever and tachycardic. no chest pain,sob,dizziness.  The  reports she woke up at 4 AM yesterday morning with a diffuse rash on her arms and legs looked like hives. The  reports that she at ribs and salmon last 24 hours prior to events. He reports that the patient has note tried new meds, all her current meds she has been on for years. does not recall an insect bite. She has no dysuria, no urgency no frequency. We were called to see patient     REVIEW OF SYSTEMS:  All other review of systems negative (Comprehensive ROS)    PAST MEDICAL & SURGICAL HISTORY:  Fall  Passed out  Fracture of femur  Hypertension  S/P ORIF (open reduction internal fixation) fracture      Allergies    hydrALAZINE (Rash)  Lexapro (Other)    Intolerances        Antimicrobials Day #      Other Medications:  diltiazem    milliGRAM(s) Oral daily  enoxaparin Injectable 40 milliGRAM(s) SubCutaneous daily  fluocinonide 0.05% Cream 1 Application(s) Topical two times a day  hydrocortisone sodium succinate Injectable 50 milliGRAM(s) IV Push two times a day  hydrOXYzine hydrochloride 25 milliGRAM(s) Oral two times a day  ondansetron Injectable 4 milliGRAM(s) IV Push once  oxyCODONE    5 mG/acetaminophen 325 mG 1 Tablet(s) Oral every 12 hours PRN  sodium chloride 0.9%. 1000 milliLiter(s) IV Continuous <Continuous>      FAMILY HISTORY: non contributory       SOCIAL HISTORY:  Smoking:     ETOH:     Drug Use:     Single     T(F): 99.6 (05-26-18 @ 11:03), Max: 99.6 (05-26-18 @ 11:03)  HR: 112 (05-26-18 @ 11:03)  BP: 96/58 (05-26-18 @ 11:03)  RR: 20 (05-26-18 @ 11:03)  SpO2: 94% (05-26-18 @ 11:03)  Wt(kg): --    PHYSICAL EXAM:  General: alert, no acute distress  Eyes:  anicteric, no conjunctival injection, no discharge  Oropharynx: no lesions or injection 	  Neck: supple, without adenopathy  Lungs: clear to auscultation  Heart: regular rate and rhythm; no murmur, rubs or gallops  Abdomen: soft, nondistended, nontender, without mass or organomegaly  Skin: macular rash through her body face arms legs   Extremities: no clubbing, cyanosis, or edema  Neurologic: alert, oriented, moves all extremities    LAB RESULTS:                        12.4   10.99 )-----------( 323      ( 26 May 2018 09:05 )             38.5     05-26    135  |  104  |  27<H>  ----------------------------<  125<H>  4.2   |  19<L>  |  0.75    Ca    8.7      26 May 2018 07:03    TPro  6.4  /  Alb  3.7  /  TBili  0.6  /  DBili  x   /  AST  23  /  ALT  33  /  AlkPhos  176<H>  05-25    LIVER FUNCTIONS - ( 25 May 2018 22:36 )  Alb: 3.7 g/dL / Pro: 6.4 g/dL / ALK PHOS: 176 U/L / ALT: 33 U/L / AST: 23 U/L / GGT: x           Urinalysis Basic - ( 25 May 2018 22:15 )    Color: Yellow / Appearance: SL Turbid / SG: >1.030 / pH: x  Gluc: x / Ketone: Negative  / Bili: Negative / Urobili: Negative   Blood: x / Protein: 100 mg/dL / Nitrite: Negative   Leuk Esterase: Moderate / RBC: 0-2 /HPF / WBC 6-10 /HPF   Sq Epi: x / Non Sq Epi: OCC /HPF / Bacteria: x        MICROBIOLOGY:  RECENT CULTURES:        RADIOLOGY REVIEWED:

## 2018-05-26 NOTE — PROGRESS NOTE ADULT - SUBJECTIVE AND OBJECTIVE BOX
CARDIOLOGY     PROGRESS  NOTE   ________________________________________________    CHIEF COMPLAINT:Patient is a 73y old  Female who presents with a chief complaint of fever/chills/rash (25 May 2018 21:15)  doing better.  	  REVIEW OF SYSTEMS:  CONSTITUTIONAL: No fever, weight loss, or fatigue  EYES: No eye pain, visual disturbances, or discharge  ENT:  No difficulty hearing, tinnitus, vertigo; No sinus or throat pain  NECK: No pain or stiffness  RESPIRATORY: No cough, wheezing, chills or hemoptysis; No Shortness of Breath  CARDIOVASCULAR: No chest pain, palpitations, passing out, dizziness, or leg swelling  GASTROINTESTINAL: No abdominal or epigastric pain. No nausea, vomiting, or hematemesis; No diarrhea or constipation. No melena or hematochezia.  GENITOURINARY: No dysuria, frequency, hematuria, or incontinence  NEUROLOGICAL: No headaches, memory loss, loss of strength, numbness, or tremors  SKIN: No itching, burning, rashes, or lesions   LYMPH Nodes: No enlarged glands  ENDOCRINE: No heat or cold intolerance; No hair loss  MUSCULOSKELETAL: No joint pain or swelling; No muscle, back, or extremity pain  PSYCHIATRIC: No depression, anxiety, mood swings, or difficulty sleeping  HEME/LYMPH: No easy bruising, or bleeding gums  ALLERGY AND IMMUNOLOGIC: No hives or eczema	    [ ] All others negative	  [ ] Unable to obtain    PHYSICAL EXAM:  T(C): 37.2 (05-26-18 @ 05:46), Max: 37.2 (05-25-18 @ 19:46)  HR: 118 (05-26-18 @ 05:46) (101 - 118)  BP: 107/52 (05-26-18 @ 05:46) (101/67 - 137/72)  RR: 20 (05-26-18 @ 05:46) (18 - 20)  SpO2: 95% (05-26-18 @ 05:46) (92% - 97%)  Wt(kg): --  I&O's Summary    25 May 2018 07:01  -  26 May 2018 07:00  --------------------------------------------------------  IN: 350 mL / OUT: 0 mL / NET: 350 mL        Appearance: Normal	  HEENT:   Normal oral mucosa, PERRL, EOMI	  Lymphatic: No lymphadenopathy  Cardiovascular: Normal S1 S2, No JVD, + murmurs, No edema  Respiratory: Lungs clear to auscultation	  Psychiatry: A & O x 3, Mood & affect appropriate  Gastrointestinal:  Soft, Non-tender, + BS	  Skin: No rashes, No ecchymoses, No cyanosis	  Neurologic: Non-focal  Extremities: Normal range of motion, No clubbing, cyanosis or edema  Vascular: Peripheral pulses palpable 2+ bilaterally    MEDICATIONS  (STANDING):  cefTRIAXone   IVPB 1 Gram(s) IV Intermittent every 24 hours  diltiazem    milliGRAM(s) Oral daily  enoxaparin Injectable 40 milliGRAM(s) SubCutaneous daily  lisinopril 20 milliGRAM(s) Oral two times a day  ondansetron Injectable 4 milliGRAM(s) IV Push once  sodium chloride 0.9%. 1000 milliLiter(s) (70 mL/Hr) IV Continuous <Continuous>      TELEMETRY: 	    ECG:  	  RADIOLOGY:  OTHER: 	  	  LABS:	 	    CARDIAC MARKERS:                                14.0   14.6  )-----------( 378      ( 25 May 2018 22:36 )             42.8     05-26    135  |  104  |  27<H>  ----------------------------<  125<H>  4.2   |  19<L>  |  0.75    Ca    8.7      26 May 2018 07:03    TPro  6.4  /  Alb  3.7  /  TBili  0.6  /  DBili  x   /  AST  23  /  ALT  33  /  AlkPhos  176<H>  05-25    proBNP:   Lipid Profile:   HgA1c:   TSH: Thyroid Stimulating Hormone, Serum: 2.59 uIU/mL (05-26 @ 04:33)          Assessment and plan  ---------------------------  urosepsis  doing better  ivf  decrease lisinopril

## 2018-05-26 NOTE — CONSULT NOTE ADULT - ASSESSMENT
Patient is a 73y female with h hx of htn, anxiety disorder who presented to er with c/o fever ,chills, rash starting 4 am .  She presents with diffuse macular rash through her whole body, not clear etiology possible hypersentivity reaction, drug reaction or a viral exanthem?   she has no urinary sx no dysuria, no urgency no frequency doubt uti   plan   i dont see indication for abx use   I will order a RVP, check for viral illness   Suggest for medicine to obtain dermatology consultation to assess rash

## 2018-05-26 NOTE — CONSULT NOTE ADULT - ASSESSMENT
ot with htn, anxiety, hio surgery with fevers   high wbc, uti  on rocephin,   persistent tachycardia, mild  hyponatremia   card to f/p   echo   dvt ppx ot with htn, anxiety, hio surgery with fevers  rash  on  admisssion,  worsening today   stopped rocephin   iv steroid, derm eval   high wbc, uti  on rocephin,   persistent tachycardia, mild  hyponatremia   card to f/p   echo   dvt ppx

## 2018-05-26 NOTE — CONSULT NOTE ADULT - SUBJECTIVE AND OBJECTIVE BOX
HPI:  Patient is a 72 yo female with a PMH of HTN and anxiety who presented to the ER on 5/25 with fever (102), chills, rash starting 4 am. The rash started on the arms and spread to the trunk. It is very itchy. Currently only present on the trunk and is improving. Associated with eye swelling. No lip swelling, throat pain, SOB, N/V/D. Has never had this rash before. Of note, 24 hours prior to onset, patient ate ribs and salmon (has eaten before). Has been on her meds for years. Recent travel to [place].    PAST MEDICAL & SURGICAL HISTORY:  Fall  Passed out  Fracture of femur  Hypertension  S/P ORIF (open reduction internal fixation) fracture      REVIEW OF SYSTEMS      General: no fevers/chills, positive lethargy	    Skin/Breast: see HPI  	  Ophthalmologic: no eye pain or change in vision  	  ENMT: no dysphagia or change in hearing    Respiratory and Thorax: no SOB or cough  	  Cardiovascular: no palpitations or chest pain    Gastrointestinal: no abdominal pain or blood in stool     Genitourinary: no dysuria or frequency    Musculoskeletal: no joint pains or weakness	    Neurological: no weakness, numbness , or tingling    MEDICATIONS  (STANDING):  diltiazem    milliGRAM(s) Oral daily  enoxaparin Injectable 40 milliGRAM(s) SubCutaneous daily  fluocinonide 0.05% Cream 1 Application(s) Topical two times a day  hydrocortisone sodium succinate Injectable 50 milliGRAM(s) IV Push two times a day  hydrOXYzine hydrochloride 25 milliGRAM(s) Oral two times a day  ondansetron Injectable 4 milliGRAM(s) IV Push once  sodium chloride 0.9%. 1000 milliLiter(s) (100 mL/Hr) IV Continuous <Continuous>    MEDICATIONS  (PRN):  oxyCODONE    5 mG/acetaminophen 325 mG 1 Tablet(s) Oral every 12 hours PRN Moderate Pain (4 - 6)      Allergies    hydrALAZINE (Rash)  Lexapro (Other)    Intolerances        SOCIAL HISTORY: No smoking    FAMILY HISTORY: NC      Vital Signs Last 24 Hrs  T(C): 37.6 (26 May 2018 11:03), Max: 37.6 (26 May 2018 11:03)  T(F): 99.6 (26 May 2018 11:03), Max: 99.6 (26 May 2018 11:03)  HR: 112 (26 May 2018 11:03) (101 - 118)  BP: 96/58 (26 May 2018 11:03) (96/58 - 137/72)  BP(mean): --  RR: 20 (26 May 2018 11:03) (18 - 20)  SpO2: 94% (26 May 2018 11:03) (92% - 97%)    PHYSICAL EXAM:     The patient was alert and oriented X 3, well nourished, and in no  apparent distress.  OP showed no ulcerations  There was no visible lymphadenopathy.  Conjunctiva were non injected  There was no clubbing or edema of extremities.  The scalp, hair, face, eyebrows, lips, OP, neck, chest, back,   extremities X 4, nails were examined.  There was no hyperhidrosis or bromhidrosis.    Of note on skin exam:   >2 cm, pink, edematous plaques with central pallor located on lower abdomen and back. Edema of b/l eyes. No lip edema.    LABS:                        12.4   10.99 )-----------( 323      ( 26 May 2018 09:05 )             38.5     05-26    135  |  104  |  27<H>  ----------------------------<  125<H>  4.2   |  19<L>  |  0.75    Ca    8.7      26 May 2018 07:03    TPro  6.4  /  Alb  3.7  /  TBili  0.6  /  DBili  x   /  AST  23  /  ALT  33  /  AlkPhos  176<H>  05-25      Urinalysis Basic - ( 25 May 2018 22:15 )    Color: Yellow / Appearance: SL Turbid / SG: >1.030 / pH: x  Gluc: x / Ketone: Negative  / Bili: Negative / Urobili: Negative   Blood: x / Protein: 100 mg/dL / Nitrite: Negative   Leuk Esterase: Moderate / RBC: 0-2 /HPF / WBC 6-10 /HPF   Sq Epi: x / Non Sq Epi: OCC /HPF / Bacteria: x        RADIOLOGY & ADDITIONAL STUDIES:

## 2018-05-26 NOTE — CONSULT NOTE ADULT - ASSESSMENT
Patient is a 73 y.o F who presents with fever, tachycardia, and a rash.    1. Urticaria  - 50% of acute is idiopathic; 40% of cases are associated with an upper respiratory infection, 9% with drugs, and 1% with foods  - Given patient history, the likely triggers are URI vs. food  - Would start Prednisone .5 mg/kg per day for 14 days  - Start Cetirizine 10 mg twice a day and continue hydroxyzine 25 mg at night  - Okay to continue fluocinonide cream .05% BID as needed for itch  - Would continue to observe patient for improvement of eye swelling and development of systemic symptoms (particularly respiratory, cardiovascular and GI)  - Patient seen and discussed with Dr. Belcher  - Follow-up with Dr. Belcher after discharge ([phone]) Patient is a 73 y.o F who presents with fever, tachycardia, and a rash.    1. Urticaria  - 50% of acute is idiopathic; with other cases associated with infection, drugs, and foods  - Given patient history of fever and no new medications in the past two months, the likely trigger are infection vs. food  - Start Cetirizine 10 mg twice a day and continue hydroxyzine 25 mg at night  - Okay to continue fluocinonide cream .05% BID as needed for itch  - If fever has subsided and infectious cause has been rule out, Consider prednisone .5 mg/kg per day for 14 days in addition to continuing antihistamines for urticaria.  - Would continue to observe patient for improvement of eye swelling and development of systemic symptoms (particularly respiratory, cardiovascular and GI)  - Patient seen and discussed with Dr. Belcher  - Follow-up with Dr. Belcher after discharge ([phone])

## 2018-05-27 LAB
ANION GAP SERPL CALC-SCNC: 12 MMOL/L — SIGNIFICANT CHANGE UP (ref 5–17)
BASOPHILS # BLD AUTO: 0.03 K/UL — SIGNIFICANT CHANGE UP (ref 0–0.2)
BASOPHILS NFR BLD AUTO: 0.3 % — SIGNIFICANT CHANGE UP (ref 0–2)
BUN SERPL-MCNC: 26 MG/DL — HIGH (ref 7–23)
CALCIUM SERPL-MCNC: 8.9 MG/DL — SIGNIFICANT CHANGE UP (ref 8.4–10.5)
CHLORIDE SERPL-SCNC: 104 MMOL/L — SIGNIFICANT CHANGE UP (ref 96–108)
CO2 SERPL-SCNC: 19 MMOL/L — LOW (ref 22–31)
CREAT SERPL-MCNC: 0.77 MG/DL — SIGNIFICANT CHANGE UP (ref 0.5–1.3)
EOSINOPHIL # BLD AUTO: 0.04 K/UL — SIGNIFICANT CHANGE UP (ref 0–0.5)
EOSINOPHIL NFR BLD AUTO: 0.4 % — SIGNIFICANT CHANGE UP (ref 0–6)
GLUCOSE SERPL-MCNC: 100 MG/DL — HIGH (ref 70–99)
HCT VFR BLD CALC: 39.4 % — SIGNIFICANT CHANGE UP (ref 34.5–45)
HGB BLD-MCNC: 12.6 G/DL — SIGNIFICANT CHANGE UP (ref 11.5–15.5)
IMM GRANULOCYTES NFR BLD AUTO: 0.7 % — SIGNIFICANT CHANGE UP (ref 0–1.5)
LYMPHOCYTES # BLD AUTO: 29.7 % — SIGNIFICANT CHANGE UP (ref 13–44)
LYMPHOCYTES # BLD AUTO: 3.15 K/UL — SIGNIFICANT CHANGE UP (ref 1–3.3)
MCHC RBC-ENTMCNC: 26.6 PG — LOW (ref 27–34)
MCHC RBC-ENTMCNC: 32 GM/DL — SIGNIFICANT CHANGE UP (ref 32–36)
MCV RBC AUTO: 83.3 FL — SIGNIFICANT CHANGE UP (ref 80–100)
MONOCYTES # BLD AUTO: 0.73 K/UL — SIGNIFICANT CHANGE UP (ref 0–0.9)
MONOCYTES NFR BLD AUTO: 6.9 % — SIGNIFICANT CHANGE UP (ref 2–14)
NEUTROPHILS # BLD AUTO: 6.6 K/UL — SIGNIFICANT CHANGE UP (ref 1.8–7.4)
NEUTROPHILS NFR BLD AUTO: 62 % — SIGNIFICANT CHANGE UP (ref 43–77)
PLATELET # BLD AUTO: 338 K/UL — SIGNIFICANT CHANGE UP (ref 150–400)
POTASSIUM SERPL-MCNC: 4 MMOL/L — SIGNIFICANT CHANGE UP (ref 3.5–5.3)
POTASSIUM SERPL-SCNC: 4 MMOL/L — SIGNIFICANT CHANGE UP (ref 3.5–5.3)
RBC # BLD: 4.73 M/UL — SIGNIFICANT CHANGE UP (ref 3.8–5.2)
RBC # FLD: 14 % — SIGNIFICANT CHANGE UP (ref 10.3–14.5)
SODIUM SERPL-SCNC: 135 MMOL/L — SIGNIFICANT CHANGE UP (ref 135–145)
WBC # BLD: 10.62 K/UL — HIGH (ref 3.8–10.5)
WBC # FLD AUTO: 10.62 K/UL — HIGH (ref 3.8–10.5)

## 2018-05-27 RX ADMIN — LORATADINE 10 MILLIGRAM(S): 10 TABLET ORAL at 05:48

## 2018-05-27 RX ADMIN — Medication 50 MILLIGRAM(S): at 17:31

## 2018-05-27 RX ADMIN — Medication 25 MILLIGRAM(S): at 21:20

## 2018-05-27 RX ADMIN — LORATADINE 10 MILLIGRAM(S): 10 TABLET ORAL at 17:23

## 2018-05-27 RX ADMIN — Medication 1 APPLICATION(S): at 17:24

## 2018-05-27 RX ADMIN — Medication 1 APPLICATION(S): at 05:48

## 2018-05-27 RX ADMIN — Medication 300 MILLIGRAM(S): at 11:08

## 2018-05-27 RX ADMIN — Medication 50 MILLIGRAM(S): at 07:21

## 2018-05-27 RX ADMIN — ENOXAPARIN SODIUM 40 MILLIGRAM(S): 100 INJECTION SUBCUTANEOUS at 11:12

## 2018-05-27 RX ADMIN — SODIUM CHLORIDE 100 MILLILITER(S): 9 INJECTION INTRAMUSCULAR; INTRAVENOUS; SUBCUTANEOUS at 17:33

## 2018-05-27 NOTE — PROGRESS NOTE ADULT - SUBJECTIVE AND OBJECTIVE BOX
CC: f/u for fever, afebrile today     Patient reports feeling better, rash is better, has some itching she scratches     REVIEW OF SYSTEMS:  All other review of systems negative (Comprehensive ROS)      T(F): 98.4 (05-27-18 @ 08:03), Max: 100.8 (05-26-18 @ 19:08)  HR: 101 (05-27-18 @ 08:03)  BP: 116/62 (05-27-18 @ 08:03)  RR: 20 (05-27-18 @ 08:03)  SpO2: 97% (05-27-18 @ 08:03)  Wt(kg): --    PHYSICAL EXAM:  General: alert, no acute distress  Eyes:  anicteric, no conjunctival injection, no discharge  Oropharynx: no lesions or injection 	  Neck: supple, without adenopathy  Lungs: clear to auscultation  Heart: regular rate and rhythm; no murmur, rubs or gallops  Abdomen: soft, nondistended, nontender, without mass or organomegaly  Skin: no lesions  Extremities: macular rash on her legs have improved   Neurologic: alert, oriented, moves all extremities    LAB RESULTS:                        12.6   10.62 )-----------( 338      ( 27 May 2018 08:56 )             39.4     05-27    135  |  104  |  26<H>  ----------------------------<  100<H>  4.0   |  19<L>  |  0.77    Ca    8.9      27 May 2018 07:24    TPro  6.4  /  Alb  3.7  /  TBili  0.6  /  DBili  x   /  AST  23  /  ALT  33  /  AlkPhos  176<H>  05-25    LIVER FUNCTIONS - ( 25 May 2018 22:36 )  Alb: 3.7 g/dL / Pro: 6.4 g/dL / ALK PHOS: 176 U/L / ALT: 33 U/L / AST: 23 U/L / GGT: x           Urinalysis Basic - ( 25 May 2018 22:15 )    Color: Yellow / Appearance: SL Turbid / SG: >1.030 / pH: x  Gluc: x / Ketone: Negative  / Bili: Negative / Urobili: Negative   Blood: x / Protein: 100 mg/dL / Nitrite: Negative   Leuk Esterase: Moderate / RBC: 0-2 /HPF / WBC 6-10 /HPF   Sq Epi: x / Non Sq Epi: OCC /HPF / Bacteria: x      MICROBIOLOGY:  RECENT CULTURES:  05-26 @ 02:13 .Urine Clean Catch (Midstream)     <10,000 CFU/ml  Normal Urogenital maeve present    Culture - Blood (05.26.18 @ 02:13)    Specimen Source: .Blood Blood-Peripheral    Culture Results:   No growth to date.    Culture - Blood (05.26.18 @ 02:13)    Specimen Source: .Blood Blood-Peripheral    Culture Results:   No growth to date.          RADIOLOGY REVIEWED:        Antimicrobials Day #      Other Medications Reviewed

## 2018-05-27 NOTE — PROGRESS NOTE ADULT - SUBJECTIVE AND OBJECTIVE BOX
CARDIOLOGY     PROGRESS  NOTE   ________________________________________________    CHIEF COMPLAINT:Patient is a 73y old  Female who presents with a chief complaint of fever/chills/rash (25 May 2018 21:15)    	  REVIEW OF SYSTEMS:  CONSTITUTIONAL: No fever, weight loss, or fatigue  EYES: No eye pain, visual disturbances, or discharge  ENT:  No difficulty hearing, tinnitus, vertigo; No sinus or throat pain  NECK: No pain or stiffness  RESPIRATORY: No cough, wheezing, chills or hemoptysis; No Shortness of Breath  CARDIOVASCULAR: No chest pain, palpitations, passing out, dizziness, or leg swelling  GASTROINTESTINAL: No abdominal or epigastric pain. No nausea, vomiting, or hematemesis; No diarrhea or constipation. No melena or hematochezia.  GENITOURINARY: No dysuria, frequency, hematuria, or incontinence  NEUROLOGICAL: No headaches, memory loss, loss of strength, numbness, or tremors  SKIN: No itching, burning, rashes, or lesions   LYMPH Nodes: No enlarged glands  ENDOCRINE: No heat or cold intolerance; No hair loss  MUSCULOSKELETAL: No joint pain or swelling; No muscle, back, or extremity pain  PSYCHIATRIC: No depression, anxiety, mood swings, or difficulty sleeping  HEME/LYMPH: No easy bruising, or bleeding gums  ALLERGY AND IMMUNOLOGIC: No hives or eczema	    [ ] All others negative	  [ ] Unable to obtain    PHYSICAL EXAM:  T(C): 36.9 (05-27-18 @ 08:03), Max: 38.2 (05-26-18 @ 19:08)  HR: 101 (05-27-18 @ 08:03) (96 - 112)  BP: 116/62 (05-27-18 @ 08:03) (96/58 - 147/71)  RR: 20 (05-27-18 @ 08:03) (20 - 20)  SpO2: 97% (05-27-18 @ 08:03) (93% - 97%)  Wt(kg): --  I&O's Summary    26 May 2018 07:01  -  27 May 2018 07:00  --------------------------------------------------------  IN: 660 mL / OUT: 0 mL / NET: 660 mL        Appearance: Normal	  HEENT:   Normal oral mucosa, PERRL, EOMI	  Lymphatic: No lymphadenopathy  Cardiovascular: Normal S1 S2, No JVD, + murmurs, No edema  Respiratory: Lungs clear to auscultation	  Psychiatry: A & O x 3, Mood & affect appropriate  Gastrointestinal:  Soft, Non-tender, + BS	  Skin: No rashes, No ecchymoses, No cyanosis	  Neurologic: Non-focal  Extremities: Normal range of motion, No clubbing, cyanosis or edema  Vascular: Peripheral pulses palpable 2+ bilaterally  rash is improving    MEDICATIONS  (STANDING):  diltiazem    milliGRAM(s) Oral daily  enoxaparin Injectable 40 milliGRAM(s) SubCutaneous daily  fluocinonide 0.05% Cream 1 Application(s) Topical two times a day  hydrocortisone sodium succinate Injectable 50 milliGRAM(s) IV Push two times a day  hydrOXYzine hydrochloride 25 milliGRAM(s) Oral at bedtime  loratadine 10 milliGRAM(s) Oral <User Schedule>  ondansetron Injectable 4 milliGRAM(s) IV Push once  sodium chloride 0.9%. 1000 milliLiter(s) (100 mL/Hr) IV Continuous <Continuous>      TELEMETRY: 	    ECG:  	  RADIOLOGY:  OTHER: 	  	  LABS:	 	    CARDIAC MARKERS:                                12.6   10.62 )-----------( 338      ( 27 May 2018 08:56 )             39.4     05-27    135  |  104  |  26<H>  ----------------------------<  100<H>  4.0   |  19<L>  |  0.77    Ca    8.9      27 May 2018 07:24    TPro  6.4  /  Alb  3.7  /  TBili  0.6  /  DBili  x   /  AST  23  /  ALT  33  /  AlkPhos  176<H>  05-25    proBNP:   Lipid Profile:   HgA1c:   TSH: Thyroid Stimulating Hormone, Serum: 2.59 uIU/mL (05-26 @ 04:33)          Assessment and plan  ---------------------------  doing better.  awaiting culture  ID follow up

## 2018-05-27 NOTE — PROGRESS NOTE ADULT - ASSESSMENT
Patient is a 73y female with h hx of htn, anxiety disorder who presented to er with c/o fever ,chills, rash starting 4 am .  She presents with diffuse macular rash through her whole body, not clear etiology possible hypersentivity reaction, drug reaction or a viral exanthem?   Her fever may be explained by rash hypersensitivity reaction food, she was seen by derm she prescribed antihistamines rash is much improved, cause is not clear, her blood cx are no growth , here white count is wnl, RVP negative, no infection apparent   plan   supportive care as per medicine and dermatology

## 2018-05-28 ENCOUNTER — TRANSCRIPTION ENCOUNTER (OUTPATIENT)
Age: 73
End: 2018-05-28

## 2018-05-28 RX ORDER — DILTIAZEM HCL 120 MG
360 CAPSULE, EXT RELEASE 24 HR ORAL DAILY
Qty: 0 | Refills: 0 | Status: DISCONTINUED | OUTPATIENT
Start: 2018-05-28 | End: 2018-05-28

## 2018-05-28 RX ORDER — METOPROLOL TARTRATE 50 MG
25 TABLET ORAL DAILY
Qty: 0 | Refills: 0 | Status: DISCONTINUED | OUTPATIENT
Start: 2018-05-28 | End: 2018-05-28

## 2018-05-28 RX ORDER — HYDROXYZINE HCL 10 MG
1 TABLET ORAL
Qty: 10 | Refills: 0
Start: 2018-05-28 | End: 2018-06-06

## 2018-05-28 RX ORDER — FLUOCINONIDE/EMOLLIENT BASE 0.05 %
1 CREAM (GRAM) TOPICAL
Qty: 1 | Refills: 0
Start: 2018-05-28 | End: 2018-06-06

## 2018-05-28 RX ORDER — DILTIAZEM HCL 120 MG
360 CAPSULE, EXT RELEASE 24 HR ORAL DAILY
Qty: 0 | Refills: 0 | Status: DISCONTINUED | OUTPATIENT
Start: 2018-05-29 | End: 2018-05-29

## 2018-05-28 RX ORDER — DILTIAZEM HCL 120 MG
3 CAPSULE, EXT RELEASE 24 HR ORAL
Qty: 0 | Refills: 0 | COMMUNITY

## 2018-05-28 RX ORDER — ALPRAZOLAM 0.25 MG
0.25 TABLET ORAL
Qty: 0 | Refills: 0 | Status: DISCONTINUED | OUTPATIENT
Start: 2018-05-28 | End: 2018-05-29

## 2018-05-28 RX ORDER — LORATADINE 10 MG/1
1 TABLET ORAL
Qty: 20 | Refills: 0
Start: 2018-05-28 | End: 2018-06-06

## 2018-05-28 RX ADMIN — Medication 1 APPLICATION(S): at 05:13

## 2018-05-28 RX ADMIN — Medication 0.25 MILLIGRAM(S): at 11:39

## 2018-05-28 RX ADMIN — Medication 25 MILLIGRAM(S): at 21:10

## 2018-05-28 RX ADMIN — ENOXAPARIN SODIUM 40 MILLIGRAM(S): 100 INJECTION SUBCUTANEOUS at 11:36

## 2018-05-28 RX ADMIN — Medication 300 MILLIGRAM(S): at 07:53

## 2018-05-28 RX ADMIN — Medication 50 MILLIGRAM(S): at 05:12

## 2018-05-28 RX ADMIN — Medication 1 APPLICATION(S): at 18:38

## 2018-05-28 RX ADMIN — LORATADINE 10 MILLIGRAM(S): 10 TABLET ORAL at 12:44

## 2018-05-28 RX ADMIN — Medication 25 MILLIGRAM(S): at 10:17

## 2018-05-28 RX ADMIN — LORATADINE 10 MILLIGRAM(S): 10 TABLET ORAL at 05:12

## 2018-05-28 RX ADMIN — Medication 0.25 MILLIGRAM(S): at 21:10

## 2018-05-28 RX ADMIN — Medication 50 MILLIGRAM(S): at 17:54

## 2018-05-28 NOTE — DISCHARGE NOTE ADULT - CARE PROVIDER_API CALL
Abida Belcher), Dermatology  [address] [facility]  [address]  [place] [address]  Phone: [phone]  Fax: [phone] Abida Belcher), Dermatology  [address] [facility]  [address]  [place] [address]  Phone: [phone]  Fax: [phone]    Isabelle Villeda), Internal Medicine  [address] [place] [address]  Phone: [phone]  Fax: [phone] Abida Belcher), Dermatology  [address] [facility]  [address]  [place] [address]  Phone: [phone]  Fax: [phone]    Isabelle Villeda), Internal Medicine  [address] [place] [address]  Phone: [phone]  Fax: [phone]    Angy Wagner (DO), Cardiology Medicine  [address] [place] [address]  [address] [place] [address]  Phone: [phone]  Fax: [phone]

## 2018-05-28 NOTE — DISCHARGE NOTE ADULT - HOSPITAL COURSE
73y female with PMH of HTN, Anxiety disorder who presented to ED with fever, chills, and a diffuse macular rash through her whole body. Admitted for Rash/Fever. Seen and followed by ID, Derm and Cards. Patient started on antihistamines and steroids and rash has now improved. BC have no growth, RVP is neg and WBC count is WNL. No more fevers or s/s of infection are apparent. Patient is now stable for discharge home with derm follow up on antihistamines. Per discussion with Derm no need for steroids on  discharge. 73y female with PMH of HTN, Anxiety disorder who presented to ED with fever, chills, and a diffuse macular rash through her whole body. Admitted for Rash/Fever. Seen and followed by ID, Derm and Cards. Patient started on antihistamines and steroids and rash has now improved. BC have no growth, RVP is neg and WBC count is WNL. No more fevers or s/s of infection are apparent. Patient is now stable for discharge home with derm follow up on antihistamines and with her PMD for b/p management.

## 2018-05-28 NOTE — DISCHARGE NOTE ADULT - PROVIDER TOKENS
TOKEN:'[id number]' TOKEN:'[id number]:'[id number]' TOKEN:'[id number]:'[id number]'

## 2018-05-28 NOTE — PROGRESS NOTE ADULT - SUBJECTIVE AND OBJECTIVE BOX
- Patient seen and examined.  - In summary, patient is a 73 year old woman who presented with fever/chills/rash (25 May 2018 21:15)  - Today, patient is without complaints.         *****MEDICATIONS:    MEDICATIONS  (STANDING):  ALPRAZolam 0.25 milliGRAM(s) Oral two times a day  diltiazem    milliGRAM(s) Oral daily  enoxaparin Injectable 40 milliGRAM(s) SubCutaneous daily  fluocinonide 0.05% Cream 1 Application(s) Topical two times a day  hydrocortisone sodium succinate Injectable 50 milliGRAM(s) IV Push two times a day  hydrOXYzine hydrochloride 25 milliGRAM(s) Oral at bedtime  loratadine 10 milliGRAM(s) Oral <User Schedule>  metoprolol succinate ER 25 milliGRAM(s) Oral daily  ondansetron Injectable 4 milliGRAM(s) IV Push once    MEDICATIONS  (PRN):  acetaminophen   Tablet 650 milliGRAM(s) Oral every 6 hours PRN For Temp greater than 38 C (100.4 F)  oxyCODONE    5 mG/acetaminophen 325 mG 1 Tablet(s) Oral every 12 hours PRN Moderate Pain (4 - 6)           ***** REVIEW OF SYSTEM:  GEN: no fever, no chills, no pain  RESP: no SOB, no cough, no sputum  CVS: no chest pain, no palpitations, no edema  GI: no abdominal pain, no nausea, no vomiting, no constipation, no diarrhea  : no dysuria, no frequency  NEURO: no headache, no dizziness  PSYCH: no depression, not anxious  Derm : no itching, no rash         ***** VITAL SIGNS:  T(F): 99.1 (18 @ 08:04), Max: 99.1 (18 @ 00:26)  HR: 97 (18 @ 08:04) (89 - 101)  BP: 184/93 (18 @ 08:04) (143/76 - 184/93)  RR: 18 (18 @ 08:04) (18 - 19)  SpO2: 97% (18 @ 08:04) (96% - 97%)  Wt(kg): --  ,   I&O's Summary    27 May 2018 07:01  -  28 May 2018 07:00  --------------------------------------------------------  IN: 360 mL / OUT: 2 mL / NET: 358 mL             *****PHYSICAL EXAM:  GEN: A&O X 3 , NAD , comfortable  HEENT: NCAT, EOMI, MMM, no icterus  NECK: Supple, No JVD  CVS: S1S2 , regular , No M/R/G appreciated  PULM: CTA B/L,  no W/R/R appreciated  ABD.: soft. non tender, non distended,  bowel sounds present  Extrem: intact pulses , no edema noted  Derm: No rash or ecchymosis noted  PSYCH: normal mood, no depression, not anxious         *****LAB AND IMAGIN.6   10.62 )-----------( 338      ( 27 May 2018 08:56 )             39.4               05-27    135  |  104  |  26<H>  ----------------------------<  100<H>  4.0   |  19<L>  |  0.77    Ca    8.9      27 May 2018 07:24                           [All pertinent recent Imaging/Reports reviewed]         *****A S S E S S M E N T   A N D   P L A N :  73F who presented with fever,chills,rash   ID f/u noted  possible allergy rx  feels better  benadryllzantac  ivf  tx as per derm ann OLIVA for [facility]    __________________________  AGUSTINA East D.O.

## 2018-05-28 NOTE — DISCHARGE NOTE ADULT - MEDICATION SUMMARY - MEDICATIONS TO STOP TAKING
I will STOP taking the medications listed below when I get home from the hospital:    ALPRAZolam 0.25 mg oral tablet  -- 1 tab(s) by mouth once a day  -- as needed for anxiety    metoprolol tartrate 25 mg oral tablet  -- 3 tab(s) by mouth 2 times a day I will STOP taking the medications listed below when I get home from the hospital:    lisinopril 5 mg oral tablet  -- 1 tab(s) by mouth 2 times a day    metoprolol tartrate 25 mg oral tablet  -- 3 tab(s) by mouth 2 times a day    lisinopril 20 mg oral tablet  -- 1 tab(s) by mouth 2 times a day

## 2018-05-28 NOTE — DISCHARGE NOTE ADULT - PLAN OF CARE
Remain free of symptoms Please follow up with Dr. Belcher within 2-3 days of discharge ([phone])  Please continue Atarax at bedtime and Claritin as indicated until f/u with Derm HOME CARE INSTRUCTIONS  If you were prescribed antibiotics, take them exactly as your caregiver instructs you. Finish the medication even if you feel better after you have only taken some of the medication.  Drink enough water and fluids to keep your urine clear or pale yellow.  Avoid caffeine, tea, and carbonated beverages. They tend to irritate your bladder.  Empty your bladder often. Avoid holding urine for long periods of time.  Empty your bladder before and after sexual intercourse.  After a bowel movement, women should cleanse from front to back. Use each tissue only once.  SEEK MEDICAL CARE IF:  You have back pain.  You develop a fever.  Your symptoms do not begin to resolve within 3 days.  SEEK IMMEDIATE MEDICAL CARE IF:  You have severe back pain or lower abdominal pain.  You develop chills.  You have nausea or vomiting.  You have continued burning or discomfort with urination. Low salt diet  Activity as tolerated.  Take all medication as prescribed.  Follow up with your medical doctor for routine blood pressure monitoring at your next visit.  Notify your doctor if you have any of the following symptoms:   Dizziness, Lightheadedness, Blurry vision, Headache, Chest pain, Shortness of breath

## 2018-05-28 NOTE — PROGRESS NOTE ADULT - ASSESSMENT
pt with known hx of htn, who presented with fever/, chills  resolved   ,rash, maculopapular   seen by derm and ID,, non infectious  dvt prophylaxIS  bp meds adjusted  on iv  hydrocortisone now

## 2018-05-28 NOTE — DISCHARGE NOTE ADULT - ADDITIONAL INSTRUCTIONS
Please follow up with Dr. Belcher within 2-3 days Please follow up with Dr. Belcher within 2-3 days  follow up with your PMD   follow up with your Cardiologist

## 2018-05-28 NOTE — DISCHARGE NOTE ADULT - MEDICATION SUMMARY - MEDICATIONS TO CHANGE
I will SWITCH the dose or number of times a day I take the medications listed below when I get home from the hospital:    lisinopril 5 mg oral tablet  -- 1 tab(s) by mouth 2 times a day    Cardizem  mg/12 hours oral capsule, extended release  -- 1 cap(s) by mouth every 12 hours I will SWITCH the dose or number of times a day I take the medications listed below when I get home from the hospital:    Cardizem  mg/12 hours oral capsule, extended release  -- 1 cap(s) by mouth every 12 hours    isosorbide mononitrate 60 mg oral tablet, extended release  -- 1 tab(s) by mouth 2 times a day

## 2018-05-28 NOTE — DISCHARGE NOTE ADULT - PATIENT PORTAL LINK FT
You can access the [facility] Patient Portal, offered by [facility], by registering with the following website: [email or web address]

## 2018-05-28 NOTE — DISCHARGE NOTE ADULT - MEDICATION SUMMARY - MEDICATIONS TO TAKE
I will START or STAY ON the medications listed below when I get home from the hospital:    lisinopril 20 mg oral tablet  -- 1 tab(s) by mouth 2 times a day  -- Indication: For Essential hypertension    isosorbide mononitrate 60 mg oral tablet, extended release  -- 1 tab(s) by mouth 2 times a day  -- Indication: For Essential hypertension    Cardizem  mg/12 hours oral capsule, extended release  -- 3 cap(s) by mouth once a day  -- Indication: For Essential hypertension    loratadine 10 mg oral tablet  -- 1 tab(s) by mouth 2 times a day at 6am and 1pm   -- Indication: For Urticaria    hydrOXYzine hydrochloride 25 mg oral tablet  -- 1 tab(s) by mouth once a day (at bedtime)  -- Indication: For Urticaria    fluocinonide 0.05% topical cream  -- 1 application on skin 2 times a day  -- Indication: For Rash    PriLOSEC 20 mg oral delayed release capsule  -- 1 cap(s) by mouth once a day  -- Indication: For GERD I will START or STAY ON the medications listed below when I get home from the hospital:    Medrol Dosepak 4 mg oral tablet  -- Steroid Taper: Please take as directed on package insert   -- It is very important that you take or use this exactly as directed.  Do not skip doses or discontinue unless directed by your doctor.  Obtain medical advice before taking any non-prescription drugs as some may affect the action of this medication.  Take with food or milk.    -- Indication: For Urticaria    isosorbide mononitrate 60 mg oral tablet, extended release  -- 1 tab(s) by mouth once a day  -- Indication: For Essential hypertension    Cardizem  mg/12 hours oral capsule, extended release  -- 3 cap(s) by mouth once a day  -- Indication: For Essential hypertension    loratadine 10 mg oral tablet  -- 1 tab(s) by mouth 2 times a day at 6am and 1pm   -- Indication: For Urticaria    hydrOXYzine hydrochloride 25 mg oral tablet  -- 1 tab(s) by mouth once a day (at bedtime)  -- Indication: For Urticaria    ALPRAZolam 0.25 mg oral tablet  -- 1 tab(s) by mouth once a day, As Needed  -- Indication: For anxiety     fluocinonide 0.05% topical cream  -- 1 application on skin 2 times a day  -- Indication: For Rash    hydroCHLOROthiazide 12.5 mg oral capsule  -- 1 cap(s) by mouth once a day  -- Indication: For Essential hypertension    PriLOSEC 20 mg oral delayed release capsule  -- 1 cap(s) by mouth once a day  -- Indication: For GERD

## 2018-05-28 NOTE — PROGRESS NOTE ADULT - SUBJECTIVE AND OBJECTIVE BOX
rash improving  REVIEW OF SYSTEMS:  CONSTITUTIONAL: No weakness,  no   fevers      RESPIRATORY:  No    shortness of breath  , no  wheezing  CARDIOVASCULAR: No chest pain,   no  palpitations, no  cough  GASTROINTESTINAL: No abdominal  pain, no  vomiting, no  diarrhea  NEUROLOGICAL: No  focal  weakness    MEDICATIONS  (STANDING):  ALPRAZolam 0.25 milliGRAM(s) Oral two times a day  diltiazem    milliGRAM(s) Oral daily  enoxaparin Injectable 40 milliGRAM(s) SubCutaneous daily  fluocinonide 0.05% Cream 1 Application(s) Topical two times a day  hydrocortisone sodium succinate Injectable 50 milliGRAM(s) IV Push two times a day  hydrOXYzine hydrochloride 25 milliGRAM(s) Oral at bedtime  loratadine 10 milliGRAM(s) Oral <User Schedule>  metoprolol succinate ER 25 milliGRAM(s) Oral daily  ondansetron Injectable 4 milliGRAM(s) IV Push once    MEDICATIONS  (PRN):  acetaminophen   Tablet 650 milliGRAM(s) Oral every 6 hours PRN For Temp greater than 38 C (100.4 F)  oxyCODONE    5 mG/acetaminophen 325 mG 1 Tablet(s) Oral every 12 hours PRN Moderate Pain (4 - 6)      Vital Signs Last 24 Hrs  T(C): 37.3 (28 May 2018 08:04), Max: 37.3 (28 May 2018 00:26)  T(F): 99.1 (28 May 2018 08:04), Max: 99.1 (28 May 2018 00:26)  HR: 97 (28 May 2018 08:04) (89 - 101)  BP: 184/93 (28 May 2018 08:04) (143/76 - 184/93)  BP(mean): --  RR: 18 (28 May 2018 08:04) (18 - 19)  SpO2: 97% (28 May 2018 08:04) (96% - 97%)  CAPILLARY BLOOD GLUCOSE        I&O's Summary    27 May 2018 07:01  -  28 May 2018 07:00  --------------------------------------------------------  IN: 360 mL / OUT: 2 mL / NET: 358 mL        PHYSICAL EXAM:  HEAD:  Atraumatic, Normocephalic  NECK: Supple, No   JVD  CHEST/LUNG:   no     rales,     no,    rhonchi  HEART: Regular rate and rhythm;         murmur  ABDOMEN: Soft, Nontender, ;   EXTREMITIES:        edema  NEUROLOGY:  alert    LABS:                        12.6   10.62 )-----------( 338      ( 27 May 2018 08:56 )             39.4     05-27    135  |  104  |  26<H>  ----------------------------<  100<H>  4.0   |  19<L>  |  0.77    Ca    8.9      27 May 2018 07:24                      Thyroid Stimulating Hormone, Serum: 2.59 uIU/mL (05-26 @ 04:33)          Consultant(s) Notes Reviewed:      Care Discussed with Consultants/Other Providers:

## 2018-05-28 NOTE — PROGRESS NOTE ADULT - SUBJECTIVE AND OBJECTIVE BOX
CC: f/u for fever and rash    Patient reports improvement in rash, no fever overnight    REVIEW OF SYSTEMS:  All other review of systems negative (Comprehensive ROS)    Antimicrobials Day #  :off    Other Medications Reviewed    T(F): 99.1 (05-28-18 @ 00:26), Max: 99.1 (05-28-18 @ 00:26)  HR: 101 (05-28-18 @ 00:26)  BP: 158/71 (05-28-18 @ 00:26)  RR: 18 (05-28-18 @ 00:26)  SpO2: 96% (05-28-18 @ 00:26)  Wt(kg): --    PHYSICAL EXAM:  General: alert, no acute distress  Eyes:  anicteric, no conjunctival injection, no discharge  Oropharynx: no lesions or injection 	  Neck: supple, without adenopathy  Lungs: clear to auscultation  Heart: regular rate and rhythm; no murmur, rubs or gallops  Abdomen: soft, nondistended, nontender, without mass or organomegaly  Skin: generalized erythematous rash, less intense as per patient  Extremities: no clubbing, cyanosis, or edema  Neurologic: alert, oriented, moves all extremities    LAB RESULTS:                        12.6   10.62 )-----------( 338      ( 27 May 2018 08:56 )             39.4     05-27    135  |  104  |  26<H>  ----------------------------<  100<H>  4.0   |  19<L>  |  0.77    Ca    8.9      27 May 2018 07:24          MICROBIOLOGY:  RECENT CULTURES:  05-26 @ 02:13 .Urine Clean Catch (Midstream)     <10,000 CFU/ml  Normal Urogenital maeve present    5/26 blood cultures are negative      RADIOLOGY REVIEWED:

## 2018-05-28 NOTE — DISCHARGE NOTE ADULT - CARE PLAN
Principal Discharge DX:	Urticaria  Goal:	Remain free of symptoms  Assessment and plan of treatment:	Please follow up with Dr. Belcher within 2-3 days of discharge ([phone])  Please continue Atarax at bedtime and Claritin as indicated until f/u with Derm  Secondary Diagnosis:	UTI (urinary tract infection)  Assessment and plan of treatment:	HOME CARE INSTRUCTIONS  If you were prescribed antibiotics, take them exactly as your caregiver instructs you. Finish the medication even if you feel better after you have only taken some of the medication.  Drink enough water and fluids to keep your urine clear or pale yellow.  Avoid caffeine, tea, and carbonated beverages. They tend to irritate your bladder.  Empty your bladder often. Avoid holding urine for long periods of time.  Empty your bladder before and after sexual intercourse.  After a bowel movement, women should cleanse from front to back. Use each tissue only once.  SEEK MEDICAL CARE IF:  You have back pain.  You develop a fever.  Your symptoms do not begin to resolve within 3 days.  SEEK IMMEDIATE MEDICAL CARE IF:  You have severe back pain or lower abdominal pain.  You develop chills.  You have nausea or vomiting.  You have continued burning or discomfort with urination.  Secondary Diagnosis:	Essential hypertension  Assessment and plan of treatment:	Low salt diet  Activity as tolerated.  Take all medication as prescribed.  Follow up with your medical doctor for routine blood pressure monitoring at your next visit.  Notify your doctor if you have any of the following symptoms:   Dizziness, Lightheadedness, Blurry vision, Headache, Chest pain, Shortness of breath Principal Discharge DX:	Urticaria  Goal:	Remain free of symptoms  Assessment and plan of treatment:	Please follow up with Dr. Belcher within 2-3 days of discharge ([phone])  Please continue Atarax at bedtime and Claritin as indicated until f/u with Derm  Secondary Diagnosis:	Essential hypertension  Assessment and plan of treatment:	Low salt diet  Activity as tolerated.  Take all medication as prescribed.  Follow up with your medical doctor for routine blood pressure monitoring at your next visit.  Notify your doctor if you have any of the following symptoms:   Dizziness, Lightheadedness, Blurry vision, Headache, Chest pain, Shortness of breath

## 2018-05-28 NOTE — PROGRESS NOTE ADULT - ASSESSMENT
Patient is a 73y female with h hx of htn, anxiety disorder who presented to er with c/o fever ,chills, rash starting 4 am .  She presents with diffuse macular rash through her whole body, not clear etiology possible hypersentivity reaction, drug reaction or a viral exanthem?   Her fever may be explained by rash hypersensitivity reaction food, she was seen by derm she prescribed antihistamines rash is much improved, cause is not clear, her blood cx are no growth , here white count is wnl, RVP negative, no infection apparent .Slowly improving.  plan   supportive care as per medicine and dermatology   She appears to be slowly improving.  discharge planning per primary team

## 2018-05-29 VITALS — DIASTOLIC BLOOD PRESSURE: 81 MMHG | SYSTOLIC BLOOD PRESSURE: 166 MMHG

## 2018-05-29 PROCEDURE — 87086 URINE CULTURE/COLONY COUNT: CPT

## 2018-05-29 PROCEDURE — 80053 COMPREHEN METABOLIC PANEL: CPT

## 2018-05-29 PROCEDURE — 82947 ASSAY GLUCOSE BLOOD QUANT: CPT

## 2018-05-29 PROCEDURE — 85014 HEMATOCRIT: CPT

## 2018-05-29 PROCEDURE — 81001 URINALYSIS AUTO W/SCOPE: CPT

## 2018-05-29 PROCEDURE — 82435 ASSAY OF BLOOD CHLORIDE: CPT

## 2018-05-29 PROCEDURE — 84295 ASSAY OF SERUM SODIUM: CPT

## 2018-05-29 PROCEDURE — 87798 DETECT AGENT NOS DNA AMP: CPT

## 2018-05-29 PROCEDURE — 80048 BASIC METABOLIC PNL TOTAL CA: CPT

## 2018-05-29 PROCEDURE — 82803 BLOOD GASES ANY COMBINATION: CPT

## 2018-05-29 PROCEDURE — 87486 CHLMYD PNEUM DNA AMP PROBE: CPT

## 2018-05-29 PROCEDURE — 85027 COMPLETE CBC AUTOMATED: CPT

## 2018-05-29 PROCEDURE — 93005 ELECTROCARDIOGRAM TRACING: CPT

## 2018-05-29 PROCEDURE — 96374 THER/PROPH/DIAG INJ IV PUSH: CPT

## 2018-05-29 PROCEDURE — 82330 ASSAY OF CALCIUM: CPT

## 2018-05-29 PROCEDURE — 87040 BLOOD CULTURE FOR BACTERIA: CPT

## 2018-05-29 PROCEDURE — 84132 ASSAY OF SERUM POTASSIUM: CPT

## 2018-05-29 PROCEDURE — 87633 RESP VIRUS 12-25 TARGETS: CPT

## 2018-05-29 PROCEDURE — 99285 EMERGENCY DEPT VISIT HI MDM: CPT | Mod: 25

## 2018-05-29 PROCEDURE — 83605 ASSAY OF LACTIC ACID: CPT

## 2018-05-29 PROCEDURE — 87581 M.PNEUMON DNA AMP PROBE: CPT

## 2018-05-29 PROCEDURE — 84443 ASSAY THYROID STIM HORMONE: CPT

## 2018-05-29 RX ORDER — ISOSORBIDE MONONITRATE 60 MG/1
60 TABLET, EXTENDED RELEASE ORAL DAILY
Qty: 0 | Refills: 0 | Status: DISCONTINUED | OUTPATIENT
Start: 2018-05-29 | End: 2018-05-29

## 2018-05-29 RX ORDER — ISOSORBIDE MONONITRATE 60 MG/1
1 TABLET, EXTENDED RELEASE ORAL
Qty: 30 | Refills: 0
Start: 2018-05-29 | End: 2018-06-27

## 2018-05-29 RX ORDER — HYDROCHLOROTHIAZIDE 25 MG
12.5 TABLET ORAL DAILY
Qty: 0 | Refills: 0 | Status: DISCONTINUED | OUTPATIENT
Start: 2018-05-29 | End: 2018-05-29

## 2018-05-29 RX ORDER — ALPRAZOLAM 0.25 MG
1 TABLET ORAL
Qty: 0 | Refills: 0 | DISCHARGE
Start: 2018-05-29

## 2018-05-29 RX ORDER — ISOSORBIDE MONONITRATE 60 MG/1
1 TABLET, EXTENDED RELEASE ORAL
Qty: 0 | Refills: 0 | COMMUNITY

## 2018-05-29 RX ADMIN — Medication 50 MILLIGRAM(S): at 06:16

## 2018-05-29 RX ADMIN — Medication 1 APPLICATION(S): at 06:18

## 2018-05-29 RX ADMIN — LORATADINE 10 MILLIGRAM(S): 10 TABLET ORAL at 06:16

## 2018-05-29 RX ADMIN — Medication 360 MILLIGRAM(S): at 06:17

## 2018-05-29 RX ADMIN — LORATADINE 10 MILLIGRAM(S): 10 TABLET ORAL at 12:19

## 2018-05-29 RX ADMIN — Medication 0.25 MILLIGRAM(S): at 11:04

## 2018-05-29 RX ADMIN — ISOSORBIDE MONONITRATE 60 MILLIGRAM(S): 60 TABLET, EXTENDED RELEASE ORAL at 11:43

## 2018-05-29 RX ADMIN — Medication 12.5 MILLIGRAM(S): at 15:24

## 2018-05-29 RX ADMIN — ENOXAPARIN SODIUM 40 MILLIGRAM(S): 100 INJECTION SUBCUTANEOUS at 12:19

## 2018-05-29 NOTE — PROGRESS NOTE ADULT - SUBJECTIVE AND OBJECTIVE BOX
- Patient seen and examined.  - In summary, patient is a 73 year old woman who presented with fever/chills/rash (25 May 2018 21:15)  - Today, patient is without complaints.         *****MEDICATIONS:    MEDICATIONS  (STANDING):  ALPRAZolam 0.25 milliGRAM(s) Oral two times a day  diltiazem    milliGRAM(s) Oral daily  enoxaparin Injectable 40 milliGRAM(s) SubCutaneous daily  fluocinonide 0.05% Cream 1 Application(s) Topical two times a day  hydrocortisone sodium succinate Injectable 50 milliGRAM(s) IV Push two times a day  hydrOXYzine hydrochloride 25 milliGRAM(s) Oral at bedtime  isosorbide   mononitrate ER Tablet (IMDUR) 60 milliGRAM(s) Oral daily  loratadine 10 milliGRAM(s) Oral <User Schedule>  methylPREDNISolone   Oral   methylPREDNISolone 24 milliGRAM(s) Oral once  ondansetron Injectable 4 milliGRAM(s) IV Push once    MEDICATIONS  (PRN):  acetaminophen   Tablet 650 milliGRAM(s) Oral every 6 hours PRN For Temp greater than 38 C (100.4 F)  oxyCODONE    5 mG/acetaminophen 325 mG 1 Tablet(s) Oral every 12 hours PRN Moderate Pain (4 - 6)             ***** REVIEW OF SYSTEM:  GEN: no fever, no chills, no pain  RESP: no SOB, no cough, no sputum  CVS: no chest pain, no palpitations, no edema  GI: no abdominal pain, no nausea, no vomiting, no constipation, no diarrhea  : no dysuria, no frequency  NEURO: no headache, no dizziness  PSYCH: no depression, not anxious  Derm : no itching, no rash         ***** VITAL SIGNS:    T(F): 98.5 (05-29-18 @ 08:04), Max: 98.5 (05-29-18 @ 08:04)  HR: 95 (05-29-18 @ 08:04) (79 - 95)  BP: 160/110 (05-29-18 @ 08:04) (160/110 - 175/81)  RR: 18 (05-29-18 @ 08:04) (17 - 18)  SpO2: 96% (05-29-18 @ 08:04) (96% - 97%)  Wt(kg): --  ,   I&O's Summary               *****PHYSICAL EXAM:  GEN: A&O X 3 , NAD , comfortable  HEENT: NCAT, EOMI, MMM, no icterus  NECK: Supple, No JVD  CVS: S1S2 , regular , No M/R/G appreciated  PULM: CTA B/L,  no W/R/R appreciated  ABD.: soft. non tender, non distended,  bowel sounds present  Extrem: intact pulses , no edema noted  Derm: No rash or ecchymosis noted  PSYCH: normal mood, no depression, not anxious         *****LAB AND IMAGING:                                        [All pertinent recent Imaging/Reports reviewed]         *****A S S E S S M E N T   A N D   P L A N :  73F who presented with fever,chills,rash   likely allergy rx  feels better  on benadryll, zantac, steroids  tx as per derm eval  BP more elevated, pt made herself clearly anxious about BP   resumed on pre-hosp meds with improvement  cont current meds for now, consider low dose hctz  OK for DC home    __________________________  AGUSTINA East D.O.

## 2018-05-29 NOTE — PROGRESS NOTE ADULT - SUBJECTIVE AND OBJECTIVE BOX
rash improved, now  with high bp    REVIEW OF SYSTEMS:  CONSTITUTIONAL: No weakness,  no   fevers      RESPIRATORY:  No    shortness of breath  , no  wheezing  CARDIOVASCULAR: No chest pain,   no  palpitations, no  cough  GASTROINTESTINAL: No abdominal  pain, no  vomiting, no  diarrhea  NEUROLOGICAL: No  focal  weakness    MEDICATIONS  (STANDING):  ALPRAZolam 0.25 milliGRAM(s) Oral two times a day  diltiazem    milliGRAM(s) Oral daily  enoxaparin Injectable 40 milliGRAM(s) SubCutaneous daily  fluocinonide 0.05% Cream 1 Application(s) Topical two times a day  hydrocortisone sodium succinate Injectable 50 milliGRAM(s) IV Push two times a day  hydrOXYzine hydrochloride 25 milliGRAM(s) Oral at bedtime  isosorbide   mononitrate ER Tablet (IMDUR) 60 milliGRAM(s) Oral daily  loratadine 10 milliGRAM(s) Oral <User Schedule>  methylPREDNISolone   Oral   ondansetron Injectable 4 milliGRAM(s) IV Push once    MEDICATIONS  (PRN):  acetaminophen   Tablet 650 milliGRAM(s) Oral every 6 hours PRN For Temp greater than 38 C (100.4 F)  oxyCODONE    5 mG/acetaminophen 325 mG 1 Tablet(s) Oral every 12 hours PRN Moderate Pain (4 - 6)      Vital Signs Last 24 Hrs  T(C): 36.9 (29 May 2018 08:04), Max: 36.9 (29 May 2018 08:04)  T(F): 98.5 (29 May 2018 08:04), Max: 98.5 (29 May 2018 08:04)  HR: 95 (29 May 2018 08:04) (79 - 95)  BP: 160/110 (29 May 2018 08:04) (160/110 - 175/81)  BP(mean): --  RR: 18 (29 May 2018 08:04) (17 - 18)  SpO2: 96% (29 May 2018 08:04) (96% - 97%)  CAPILLARY BLOOD GLUCOSE        I&O's Summary      PHYSICAL EXAM:  HEAD:  Atraumatic, Normocephalic  NECK: Supple, No   JVD  CHEST/LUNG:   no     rales,     no,    rhonchi  HEART: Regular rate and rhythm;         murmur  ABDOMEN: Soft, Nontender, ;   EXTREMITIES:        edema  NEUROLOGY:  alert, skin, resolving rash    LABS:                          Thyroid Stimulating Hormone, Serum: 2.59 uIU/mL (05-26 @ 04:33)          Consultant(s) Notes Reviewed:      Care Discussed with Consultants/Other Providers:

## 2018-05-29 NOTE — PROGRESS NOTE ADULT - ASSESSMENT
Patient is a 73y female with h hx of htn, anxiety disorder who presented to er with c/o fever ,chills, rash starting 4 am .  She presents with diffuse macular rash through her whole body, not clear etiology possible hypersentivity reaction, drug reaction or a viral exanthem?   Her fever may be explained by rash ,?hypersensitivity reaction to food, she was seen by derm ,she prescribed antihistamines, rash is much improved, cause is not clear. Her  blood cx are no growth ,  white count is wnl, RVP negative, no infection apparent .Slowly improving.  Suggest:  supportive care as per medicine and dermatology   She appears to be slowly improving.No clear underlying infectious triggers  discharge planning per primary team

## 2018-05-29 NOTE — PROGRESS NOTE ADULT - SUBJECTIVE AND OBJECTIVE BOX
CC: f/u for rash and fever    Patient reports improvement in rash, no fever.She is alert and comfortable, hospital stay extended for BP control and tachycardia.    REVIEW OF SYSTEMS:  All other review of systems negative (Comprehensive ROS)    Antimicrobials Day #  :off    Other Medications Reviewed    T(F): 98.5 (05-29-18 @ 08:04), Max: 98.5 (05-29-18 @ 08:04)  HR: 95 (05-29-18 @ 08:04)  BP: 160/110 (05-29-18 @ 08:04)  RR: 18 (05-29-18 @ 08:04)  SpO2: 96% (05-29-18 @ 08:04)  Wt(kg): --    PHYSICAL EXAM:  General: alert, no acute distress  Eyes:  anicteric, no conjunctival injection, no discharge  Oropharynx: no lesions or injection 	  Neck: supple, without adenopathy  Lungs: clear to auscultation  Heart: regular rate and rhythm; no murmur, rubs or gallops  Abdomen: soft, nondistended, nontender, without mass or organomegaly  Skin: resolving rash mostly still visible on arms  Extremities: no clubbing, cyanosis, or edema  Neurologic: alert, oriented, moves all extremities    LAB RESULTS:              MICROBIOLOGY:  RECENT CULTURES:  05-26 @ 02:13 .Urine Clean Catch (Midstream)     <10,000 CFU/ml  Normal Urogenital maeve present  5/26 blood cultures are negative        RADIOLOGY REVIEWED:

## 2018-05-29 NOTE — PROGRESS NOTE ADULT - ASSESSMENT
pt with known hx of htn, who presented with fever/, chills  resolved   ,rash, maculopapular   seen by derm and ID,, non infectious  dvt prophylaxIS  bp meds adjusted,  imdur added back, may need toprol, if still high   ? allergic reaction to  ace  inhibitor  on  medrol dose  blanca  now

## 2018-05-31 LAB
CULTURE RESULTS: SIGNIFICANT CHANGE UP
CULTURE RESULTS: SIGNIFICANT CHANGE UP
SPECIMEN SOURCE: SIGNIFICANT CHANGE UP
SPECIMEN SOURCE: SIGNIFICANT CHANGE UP

## 2018-07-04 NOTE — CONSULT NOTE ADULT - CONSULT REQUESTED DATE/TIME
26-May-2018
26-May-2018 12:27
26-May-2018 15:28
Pertinent PMH/PSH/FHx/SHx and Review of Systems contained within:  30 yo f with PMH of IVDA presents in ED c/o right hand pain and swelling s/p iv injection of heroin today. Patient reports taking Benadryl at home with improvement of symptoms. No fever/chills, No photophobia/eye pain/changes in vision, No ear pain/sore throat/dysphagia, No chest pain/palpitations, no SOB/cough/wheeze/stridor, No abdominal pain, No N/V/D, no dysuria/frequency/discharge, No neck/back pain, no rash, no changes in neurological status/function.

## 2018-11-21 NOTE — ED PROCEDURE NOTE - CPROC ED SITE PREP1
What are cold sores?  Cold sores are cause by the herpes simplex virus (HSV). This is a virus (germ) that may affect any part inside or around the mouth, including the lip and the area around it. An oral HSV infection may primary (first) or recurrent.   • Primary HSV: This infection commonly leads to a disease called gingivostomatitis. This is often seen in children, but can also occur in adolescents and adults.  • Recurrent HSV: After the first infection, the virus can hide in your nerves and may return. If it returns, it is now called a recurrent HSV infection, which is usually milder than the primary infection. A recurrent HSV infection is commonly known as cold sores or fever blisters. You may get sores due to this infection many times in your lifetime.     What causes oral herpes simplex virus infections?  Oral HSV infections are most often caused by HSV type 1 (HSV-1). HSV 2 often infects the genital and rear end area, but may also infect the mouth. Both types can cause primary or recurrent HSV infection. Oral HSV infection is usually spread by getting in close personal contact with an HSV-infected person. This is usually though kissing, using contaminated (dirty) utensils, or by touching the eyes or a wound with dirty hands. Oral infections cause by HSV-2 may depend on the person’s sexual practices. The blisters carry the herpes simplex virus until they heal. Stress, trauma, or exposure to head or cold may increase your risk of having a cold sore. Anything that may decrease your body’s ability to fight infection may also cause a cold sore to form.      What are the signs and symptoms of oral herpes simplex virus infections?  Signs and symptoms of oral HSV infections may depend on your present health condition. They usually develop suddenly and health without treatment in about 10 days. Signs and symptoms may include any of the following:  • Blisters or painful small, round, shallow ulcers on any part of the  mouth. That includes the gums, tongue, hard or soft palate (roof of the mouth), or floor of the mouth. It may also affect the lip or area near the mouth, such as the skin of the nose. Sores usually are filled with a yellow-white fluid and may break easily. Sores may join together to form larger open sores.   • Burning, tingling, itching, or pain at the affected area before sores form.   • Fever, chills, headache, and enlarged lymph nodes in the neck.  • Getting tired and irritated easily and more than normal.  • Loss of appetite or not wanting to eat or drink.  • Red, swollen, bleeding gums or sore throat.     How are oral herpes simplex virus infections diagnosed?  Your caregiver may check your health history, including other diseases and medicines you are taking. They will also need to know when the sores started, along with your other symptoms. You may also require any of the following.  • Culture: This is a test to grow and identify the germ that is causing your infection. A sample may be taken by rubbing a cotton swab on the ulcer or taking a sample of fluid form the blister.   How are oral herpes simplex virus infections treated?  There is no particular medicine available to cure oral HSV infections, but the sore normally heal on their own without scarring. Your caregiver may choose a medicine to fight the germs causing your infection based on your health condition. You may receive any of the following.   • Antiviral medicine:  Antiviral medicine may be given to help decrease the number of days you are sick. This medicine may be a solution that is gargles, cream that is applied directly on sores, or pills taken by mouth.   • Other medicines: Some medicines may be given to relieve your signs and symptoms. Your caregiver may also give you certain medicines to treat or control other problems.  • Vitamins: Taking 10,000 IU of Vitamin D3 per day for a week, then 5,000 IU Vitamin D3 per day for 1 month helps in  treatment of cold sores. To prevent future relapse 2,000 IU of Vitamin D3 should be take daily.   Self-Care:  • Eat soft, bland foods. Avoid salty, spicy, sharp-edged and hard foods. Eating a well-balanced diet will help healing.   • Have cool liquids to drink. This may help soothe your mouth and numb the pain. Avoid citrus or carbonated drinks, such as orange or grapefruit juice, lemonade, or soda. These liquids may cause your mouth to hurt more. You may drink better with a straw if you have blisters on the lips or tongue. You may also apply an ice cube or ice pack to you lip or suck on frozen juice bars.   • Learn was to manage stress. Deep breathing, meditation, and listening to music may help you cope with stressful events. Talk to your caregiver about other ways to manage stress.   • Use a sunscreen with a sun protective factor (SPF) 15 or greater when in the sun. Protect your lips by using lip balms or lotions with SPF when you go outdoors.   Preventing the spread of germs:  • Avoid close contact with other people, especially kissing or oral sex, until the blisters heal. The virus that causes cold sores usually is different from the one that causes sores on the genitals. However, cold sores may occur in persons who have oral sex with a partner who has genital herpes.   • Do not get close to babies or to people who are sick while you have cold sores.  • Do not share contaminated (dirty) utensils with another person.  • Do not touch the blisters or pick at the scabs. Do not touch your eyes without washing your hands first until the blisters heal. Wash your hands often.  CONTACT A CAREGIVER IF:  • You have a fever (increased body temperature).  • You get a headache or start to vomit (throw up).  • Your eyes feel irritated or you feel like you have something in your eye.  • Your skin becomes itchy, swollen, or develops a rash after taking your medicine.  SEEK CARE IMMEDIATELY IF:  • You get a fever, feel itchy, or  see pus instead of clear fluid in the sores.  • Your symptoms become worse or do not improve a week after starting treatment.  • You get new symptoms or old symptoms return after you have been treated.        normal saline

## 2019-10-01 NOTE — PATIENT PROFILE ADULT. - TEACHING/LEARNING FACTORS INFLUENCE READINESS TO LEARN
What Best Describes The Level Of Symmetry? (Check All That Apply): good How Severe Is Your Pain?: 0 out of 10 How Is Your Wound Healing?: healed What Is Your Overall Satisfaction Level With The Current Outcome?: satisfied Date Of Procedure: 05/22/2019 none

## 2020-08-25 NOTE — ED PROVIDER NOTE - PSYCHIATRIC, MLM
Initial Post Discharge Follow Up   Discharge Date: 8/23/20  Contact Date: 8/25/2020    Consent Verification:  Assessment Completed With: Patient  HIPAA Verified? Yes    Discharge Dx:      Adverse reaction to azathioprine  Sarcoidosis    Was TCC ordered: patient given a different diet per AVS? no    Medications: Reviewed medication list with the patient. Medications are up to date. Pt confirmed he did stop the recommended medications.    Current Outpatient Medications   Medication Sig Dispense Refill   • me need addressed before your next visit with your PCP?  (DME, meds, disease concerns, Etc): No     Follow up appointments:  See below. NCM attempted to schedule pt with PCP however unable to due to schedule limitations. TE will be sent to PCP office to FU.  A medications with patient,  and orders reviewed and discussed. Any changes or updates to medications and or orders sent to PCP. Alert and oriented to person, place, time/situation. normal mood and affect. no apparent risk to self or others.

## 2020-09-01 ENCOUNTER — APPOINTMENT (OUTPATIENT)
Dept: NEUROLOGY | Facility: CLINIC | Age: 75
End: 2020-09-01

## 2020-09-01 ENCOUNTER — APPOINTMENT (OUTPATIENT)
Dept: PAIN MANAGEMENT | Facility: CLINIC | Age: 75
End: 2020-09-01
Payer: MEDICARE

## 2020-09-01 VITALS — TEMPERATURE: 97.1 F

## 2020-09-01 PROCEDURE — 99204 OFFICE O/P NEW MOD 45 MIN: CPT

## 2020-10-02 ENCOUNTER — EMERGENCY (EMERGENCY)
Facility: HOSPITAL | Age: 75
LOS: 1 days | Discharge: HOME | End: 2020-10-02
Attending: EMERGENCY MEDICINE
Payer: MEDICARE

## 2020-10-02 VITALS
HEIGHT: 63 IN | OXYGEN SATURATION: 96 % | SYSTOLIC BLOOD PRESSURE: 198 MMHG | TEMPERATURE: 98 F | HEART RATE: 86 BPM | RESPIRATION RATE: 16 BRPM | WEIGHT: 139.99 LBS | DIASTOLIC BLOOD PRESSURE: 81 MMHG

## 2020-10-02 VITALS
DIASTOLIC BLOOD PRESSURE: 100 MMHG | SYSTOLIC BLOOD PRESSURE: 190 MMHG | OXYGEN SATURATION: 99 % | RESPIRATION RATE: 18 BRPM | HEART RATE: 86 BPM

## 2020-10-02 DIAGNOSIS — Z98.89 OTHER SPECIFIED POSTPROCEDURAL STATES: Chronic | ICD-10-CM

## 2020-10-02 LAB
ALBUMIN SERPL ELPH-MCNC: 4.5 G/DL — SIGNIFICANT CHANGE UP (ref 3.3–5)
ALP SERPL-CCNC: 156 U/L — HIGH (ref 40–120)
ALT FLD-CCNC: 182 U/L — HIGH (ref 10–45)
AMMONIA BLD-MCNC: 15 UMOL/L — SIGNIFICANT CHANGE UP (ref 11–55)
ANION GAP SERPL CALC-SCNC: 13 MMOL/L — SIGNIFICANT CHANGE UP (ref 5–17)
APTT BLD: 28.7 SEC — SIGNIFICANT CHANGE UP (ref 27.5–35.5)
AST SERPL-CCNC: 76 U/L — HIGH (ref 10–40)
BASOPHILS # BLD AUTO: 0.1 K/UL — SIGNIFICANT CHANGE UP (ref 0–0.2)
BASOPHILS NFR BLD AUTO: 1 % — SIGNIFICANT CHANGE UP (ref 0–2)
BILIRUB DIRECT SERPL-MCNC: 0.2 MG/DL — SIGNIFICANT CHANGE UP (ref 0–0.2)
BILIRUB SERPL-MCNC: 0.6 MG/DL — SIGNIFICANT CHANGE UP (ref 0.2–1.2)
BUN SERPL-MCNC: 13 MG/DL — SIGNIFICANT CHANGE UP (ref 7–23)
CALCIUM SERPL-MCNC: 10.8 MG/DL — HIGH (ref 8.4–10.5)
CHLORIDE SERPL-SCNC: 102 MMOL/L — SIGNIFICANT CHANGE UP (ref 96–108)
CO2 SERPL-SCNC: 22 MMOL/L — SIGNIFICANT CHANGE UP (ref 22–31)
CREAT SERPL-MCNC: 0.53 MG/DL — SIGNIFICANT CHANGE UP (ref 0.5–1.3)
EOSINOPHIL # BLD AUTO: 0.1 K/UL — SIGNIFICANT CHANGE UP (ref 0–0.5)
EOSINOPHIL NFR BLD AUTO: 1 % — SIGNIFICANT CHANGE UP (ref 0–6)
GLUCOSE SERPL-MCNC: 73 MG/DL — SIGNIFICANT CHANGE UP (ref 70–99)
HCT VFR BLD CALC: 43.4 % — SIGNIFICANT CHANGE UP (ref 34.5–45)
HGB BLD-MCNC: 14.3 G/DL — SIGNIFICANT CHANGE UP (ref 11.5–15.5)
IMM GRANULOCYTES NFR BLD AUTO: 0.8 % — SIGNIFICANT CHANGE UP (ref 0–1.5)
INR BLD: 0.89 RATIO — SIGNIFICANT CHANGE UP (ref 0.88–1.16)
LIDOCAIN IGE QN: 18 U/L — SIGNIFICANT CHANGE UP (ref 7–60)
LYMPHOCYTES # BLD AUTO: 2.67 K/UL — SIGNIFICANT CHANGE UP (ref 1–3.3)
LYMPHOCYTES # BLD AUTO: 25.6 % — SIGNIFICANT CHANGE UP (ref 13–44)
MCHC RBC-ENTMCNC: 27.9 PG — SIGNIFICANT CHANGE UP (ref 27–34)
MCHC RBC-ENTMCNC: 32.9 GM/DL — SIGNIFICANT CHANGE UP (ref 32–36)
MCV RBC AUTO: 84.6 FL — SIGNIFICANT CHANGE UP (ref 80–100)
MONOCYTES # BLD AUTO: 0.74 K/UL — SIGNIFICANT CHANGE UP (ref 0–0.9)
MONOCYTES NFR BLD AUTO: 7.1 % — SIGNIFICANT CHANGE UP (ref 2–14)
NEUTROPHILS # BLD AUTO: 6.73 K/UL — SIGNIFICANT CHANGE UP (ref 1.8–7.4)
NEUTROPHILS NFR BLD AUTO: 64.5 % — SIGNIFICANT CHANGE UP (ref 43–77)
NRBC # BLD: 0 /100 WBCS — SIGNIFICANT CHANGE UP (ref 0–0)
PLATELET # BLD AUTO: 328 K/UL — SIGNIFICANT CHANGE UP (ref 150–400)
POTASSIUM SERPL-MCNC: 3.3 MMOL/L — LOW (ref 3.5–5.3)
POTASSIUM SERPL-SCNC: 3.3 MMOL/L — LOW (ref 3.5–5.3)
PROT SERPL-MCNC: 7 G/DL — SIGNIFICANT CHANGE UP (ref 6–8.3)
PROTHROM AB SERPL-ACNC: 10.7 SEC — SIGNIFICANT CHANGE UP (ref 10.6–13.6)
RBC # BLD: 5.13 M/UL — SIGNIFICANT CHANGE UP (ref 3.8–5.2)
RBC # FLD: 12.7 % — SIGNIFICANT CHANGE UP (ref 10.3–14.5)
SARS-COV-2 RNA SPEC QL NAA+PROBE: SIGNIFICANT CHANGE UP
SODIUM SERPL-SCNC: 137 MMOL/L — SIGNIFICANT CHANGE UP (ref 135–145)
WBC # BLD: 10.42 K/UL — SIGNIFICANT CHANGE UP (ref 3.8–10.5)
WBC # FLD AUTO: 10.42 K/UL — SIGNIFICANT CHANGE UP (ref 3.8–10.5)

## 2020-10-02 PROCEDURE — 80074 ACUTE HEPATITIS PANEL: CPT

## 2020-10-02 PROCEDURE — 82140 ASSAY OF AMMONIA: CPT

## 2020-10-02 PROCEDURE — 76700 US EXAM ABDOM COMPLETE: CPT | Mod: 26

## 2020-10-02 PROCEDURE — 85610 PROTHROMBIN TIME: CPT

## 2020-10-02 PROCEDURE — 71045 X-RAY EXAM CHEST 1 VIEW: CPT | Mod: 26

## 2020-10-02 PROCEDURE — 80053 COMPREHEN METABOLIC PANEL: CPT

## 2020-10-02 PROCEDURE — 71045 X-RAY EXAM CHEST 1 VIEW: CPT

## 2020-10-02 PROCEDURE — 93010 ELECTROCARDIOGRAM REPORT: CPT

## 2020-10-02 PROCEDURE — U0003: CPT

## 2020-10-02 PROCEDURE — 82248 BILIRUBIN DIRECT: CPT

## 2020-10-02 PROCEDURE — 99284 EMERGENCY DEPT VISIT MOD MDM: CPT | Mod: 25

## 2020-10-02 PROCEDURE — 76700 US EXAM ABDOM COMPLETE: CPT

## 2020-10-02 PROCEDURE — 85730 THROMBOPLASTIN TIME PARTIAL: CPT

## 2020-10-02 PROCEDURE — 85025 COMPLETE CBC W/AUTO DIFF WBC: CPT

## 2020-10-02 PROCEDURE — 99284 EMERGENCY DEPT VISIT MOD MDM: CPT | Mod: CS

## 2020-10-02 PROCEDURE — 93005 ELECTROCARDIOGRAM TRACING: CPT

## 2020-10-02 PROCEDURE — 83690 ASSAY OF LIPASE: CPT

## 2020-10-02 RX ORDER — LIDOCAINE 4 G/100G
1 CREAM TOPICAL ONCE
Refills: 0 | Status: COMPLETED | OUTPATIENT
Start: 2020-10-02 | End: 2020-10-02

## 2020-10-02 RX ORDER — IBUPROFEN 200 MG
400 TABLET ORAL ONCE
Refills: 0 | Status: COMPLETED | OUTPATIENT
Start: 2020-10-02 | End: 2020-10-02

## 2020-10-02 RX ORDER — LISINOPRIL 2.5 MG/1
2.5 TABLET ORAL DAILY
Refills: 0 | Status: DISCONTINUED | OUTPATIENT
Start: 2020-10-02 | End: 2020-10-02

## 2020-10-02 RX ORDER — LISINOPRIL 2.5 MG/1
2.5 TABLET ORAL DAILY
Refills: 0 | Status: DISCONTINUED | OUTPATIENT
Start: 2020-10-02 | End: 2020-10-06

## 2020-10-02 RX ADMIN — Medication 400 MILLIGRAM(S): at 21:13

## 2020-10-02 RX ADMIN — LISINOPRIL 2.5 MILLIGRAM(S): 2.5 TABLET ORAL at 19:29

## 2020-10-02 RX ADMIN — LISINOPRIL 2.5 MILLIGRAM(S): 2.5 TABLET ORAL at 21:14

## 2020-10-02 RX ADMIN — LIDOCAINE 1 PATCH: 4 CREAM TOPICAL at 18:58

## 2020-10-02 NOTE — ED PROVIDER NOTE - CONSTITUTIONAL, MLM
normal... Well appearing, awake, alert, oriented to person, place, time/situation and in no apparent distress. Hard of hearing

## 2020-10-02 NOTE — ED ADULT NURSE NOTE - NSIMPLEMENTINTERV_GEN_ALL_ED
Implemented All Universal Safety Interventions:  [place] to call system. Call bell, personal items and telephone within reach. Instruct patient to call for assistance. Room bathroom lighting operational. Non-slip footwear when patient is off stretcher. Physically safe environment: no spills, clutter or unnecessary equipment. Stretcher in lowest position, wheels locked, appropriate side rails in place.

## 2020-10-02 NOTE — CONSULT NOTE ADULT - SUBJECTIVE AND OBJECTIVE BOX
Chief Complaint:  Patient is a 75y old  Female who presents with a chief complaint of inc lfts abd pain  · HPI Objective Statement: 74 yo F with pmhx htn sent in by PCP for elevated LFTs. As per patient, she had routine labs done two weeks ago that showed "really high LFTs". Patient states she spoke with her PCP Dr Solorio and sent here to be seen. Patient denies abd pain, nvd, dysuria, hematuria, melena, brbpr, history of alcohol abuse, neck pain, back pain, skin color change, chest pain, sob, cough, and fever.  sent in by pmd with incidental finding of inc lfts  pt is hard of hearing    Allergies:  hydrALAZINE (Rash)  Lexapro (Other)      Medications:      PMHX/PSHX:  Fall    Passed out    Fracture of femur    Hypertension    S/P ORIF (open reduction internal fixation) fracture        Family history:    no uc no liver disease  no cd    Social History:   no etoh no cigs no ivda     ROS:     General:  No wt loss, fevers, chills, night sweats, fatigue,   Eyes:  Good vision, no reported pain  ENT:  No sore throat, pain, runny nose, dysphagia  CV:  No pain, palpitations, hypo/hypertension  Resp:  No dyspnea, cough, tachypnea, wheezing  GI:  No pain, No nausea, No vomiting, No diarrhea, No constipation, No weight loss, No fever, No pruritis, No rectal bleeding, No tarry stools, No dysphagia,  :  No pain, bleeding, incontinence, nocturia  Muscle:  No pain, weakness  Neuro:  No weakness, tingling, memory problems  Psych:  No fatigue, insomnia, mood problems, depression  Endocrine:  No polyuria, polydipsia, cold/heat intolerance  Heme:  No petechiae, ecchymosis, easy bruisability  Skin:  No rash, tattoos, scars, edema      PHYSICAL EXAM:   Vital Signs:  Vital Signs Last 24 Hrs  T(C): 36.8 (02 Oct 2020 13:59), Max: 36.8 (02 Oct 2020 13:59)  T(F): 98.2 (02 Oct 2020 13:59), Max: 98.2 (02 Oct 2020 13:59)  HR: 86 (02 Oct 2020 13:59) (86 - 86)  BP: 198/81 (02 Oct 2020 13:59) (198/81 - 198/81)  BP(mean): --  RR: 16 (02 Oct 2020 13:59) (16 - 16)  SpO2: 96% (02 Oct 2020 13:59) (96% - 96%)  Daily Height in cm: 160.02 (02 Oct 2020 13:59)    Daily     GENERAL:  Appears stated age, well-groomed, well-nourished, no distress  HEENT:  NC/AT,  conjunctivae clear and pink, no thyromegaly, nodules, adenopathy, no JVD, sclera -anicteric  CHEST:  Full & symmetric excursion, no increased effort, breath sounds clear  HEART:  Regular rhythm, S1, S2, no murmur/rub/S3/S4, no abdominal bruit, no edema  ABDOMEN:  Soft, non-tender, non-distended, normoactive bowel sounds,  no masses ,no hepato-splenomegaly, no signs of chronic liver disease  EXTEREMITIES:  no cyanosis,clubbing or edema  SKIN:  No rash/erythema/ecchymoses/petechiae/wounds/abscess/warm/dry  NEURO:  Alert, oriented, no asterixis, no tremor, no encephalopathy    LABS:                    Imaging:

## 2020-10-02 NOTE — ED PROVIDER NOTE - CLINICAL SUMMARY MEDICAL DECISION MAKING FREE TEXT BOX
76 yo F with pmhx htn sent in by PCP for elevated LFTs. Will obtain labs, urine, cxr, ekg and ct as per Dr Solorio. Will discussed dispo with Dr Wagner pending results.

## 2020-10-02 NOTE — ED PROVIDER NOTE - ATTENDING CONTRIBUTION TO CARE
76 yo F with pmhx htn sent in by PCP for elevated LFTs with no sts but discussed with sarath lin, repeat labs, ct a/p and likely admission to dr jc, signed out pending dispo. abd soft nt, denies gi sts.

## 2020-10-02 NOTE — ED PROVIDER NOTE - SHIFT CHANGE DETAILS
***ATTENDING ADDENDUM (Dr. Ezekiel Leyva): I have received handoff from Corneliomargaret. Referred to ED for likely admission re: elevated transaminases/hepatitis? Awaiting completion of ED workup and definitive disposition. PCP: STEPHAN Solorio. Will continue to observe and monitor closely.

## 2020-10-02 NOTE — CONSULT NOTE ADULT - ASSESSMENT
inc lfts  plan    avoid all non essential hepatotoxic drugs	  Patient presented with persistent abnormal LFT  Obtain ABD sonogram  Check viral hepatitis panel  ADDIE,ASMA,AMA  Iron studies, celiac serologies  AFP, alpha 1 antitrypsin level, ceruloplasmin    Potential liver biopsy in future may be needed if persistent elevated liver function.    diet and wt control  avoid all hepatotoxic drugs also  avoid alcohol and herbel medication.  follow up liver function test.    f/u ct scan abd and pelvis

## 2020-10-02 NOTE — ED PROVIDER NOTE - NOTES
Discussed case with Dr Solorio S : wants labs and ct of the abdomen, call Dr Wagner. Spoke with Dr Wagner repeat labs and call him after about dispo

## 2020-10-02 NOTE — ED ADULT NURSE NOTE - CHPI ED NUR SYMPTOMS NEG
no dizziness/no decreased eating/drinking/no loss of consciousness/no headache/no nausea/no chills/no vomiting/no fever/no back pain/no pain

## 2020-10-02 NOTE — ED PROVIDER NOTE - PROGRESS NOTE DETAILS
Discussed labs results with Dr Wagner. Get patient US. IF negative discharge with follow up. Call back if positive **ATTENDING ADDENDUM (Dr. Ezekiel Leyva): patient serially evaluated throughout ED course by ED team. Noted with hypertension. Pending evening dose of lisinopril. Agree with PM dose (2.5 mg oral tablet ordered). Will continue to observe and monitor closely until definitive placement is made. Spoke with pt's covering PCP, Dr. Wagner. Dr. Wagner states he is okay for pt to go home since no white count, no obstructing stone/cholecystitis on US, afebrile; states pt has back pain at baseline. PT states she feels "good" and wants to go home. Pt to followup in outpt setting.   -Silver Hatfield PA-C

## 2020-10-02 NOTE — ED ADULT NURSE NOTE - OBJECTIVE STATEMENT
75F comes to ER sent by PMD for elevated liver enzymes. She states she is unsure of the number but was told they are "very high". She is a&ox3 extremely hard of hearing. Denies abdominal pain/stool color change/fever/chills/N/V/D/urine changes/skin changes. She has PMH HTN. Patient has no complaints. Abdomen soft nontender, skin without jaundice, no edema. She states she takes diclofenac daily for neck pain which she sees chiropractor for. Will continue to monitor.

## 2020-10-02 NOTE — ED PROVIDER NOTE - OBJECTIVE STATEMENT
74 yo F with pmhx htn sent in by PCP for elevated LFTs. As per patient, she had routine labs done two weeks ago that showed "really high LFTs". Patient states she spoke with her PCP Dr Solorio and sent here to be seen. Patient denies abd pain, nvd, dysuria, hematuria, melena, brbpr, history of alcohol abuse, neck pain, back pain, skin color change, chest pain, sob, cough, and fever.

## 2020-10-02 NOTE — ED PROVIDER NOTE - CARDIOVASCULAR NEGATIVE STATEMENT, MLM
Normal vision: sees adequately in most situations; can see medication labels, newsprint
no chest pain and no edema.

## 2020-10-02 NOTE — ED ADULT NURSE REASSESSMENT NOTE - NS ED NURSE REASSESS COMMENT FT1
Patient remains hypertensive, denies headache/vision changes, numbness/tingling/weakness at this time. MD Rojas made aware. No RN interventions are needed at this time. Patient states they would like to go home. MD Rojas made aware. Patient is to be discharged.
Patient seen ambulatory to bathroom with assistance of cane from home. Patient tolerating well.
Received report from AIRAM Deluna. Patient a/ox4, hypertensive, other VSS. MD Rojas made aware, patient is to receive home medication of 2.5 Lisinopril PO. Pharmacy called for medication order. Patient denies any pain/discomfort, headache, vision changes, numbness/tingling/weakness at this time.
patient sitting up in bed. Received lido patch for chronic shoulder pain. A&Ox3. States she is due for her evening BP meds. Dr Leyva aware of BP.

## 2020-10-02 NOTE — ED PROVIDER NOTE - PATIENT PORTAL LINK FT
You can access the FollowMyHealth Patient Portal offered by [facility] by registering at the following website: [email or web address]. By joining [employer]’s FollowMyHealth portal, you will also be able to view your health information using other applications (apps) compatible with our system.

## 2020-10-02 NOTE — ED PROVIDER NOTE - NSFOLLOWUPINSTRUCTIONS_ED_ALL_ED_FT
You were seen in the Emergency Department for evaluation of elevated liver enzymes. Your ultrasound showed that you have gallstones in your gallbladder. Your labs, Xray rule out any emergent findings.  Follow-up with your primary care provider in 1-2 days.  Continue to take all medications as prescribed.  Rest and drink plenty of fluids. Pain can be managed with Ibuprofen (aka Motrin or Advil) over the counter as directed.  Return to the ER for any new or worsening symptoms such as fever/chills, difficulty breathing, uncontrollable vomiting, excruciating pain. You were seen in the Emergency Department for evaluation of elevated liver enzymes. Your ultrasound showed that you have gallstones in your gallbladder. Your labs, Xray rule out any emergent findings.  Follow-up with your primary care provider in 1-2 days. Your blood pressure was high and you received Lisinopril 5mg total. Please watch over your blood pressure and follow up with PCP in regards to it.   Continue to take all medications as prescribed.  Rest and drink plenty of fluids. Pain can be managed with Ibuprofen (aka Motrin or Advil) over the counter as directed.  Return to the ER for any new or worsening symptoms such as fever/chills, difficulty breathing, uncontrollable vomiting, excruciating pain.

## 2020-10-03 LAB
HAV IGM SER-ACNC: SIGNIFICANT CHANGE UP
HBV CORE IGM SER-ACNC: SIGNIFICANT CHANGE UP
HBV SURFACE AG SER-ACNC: SIGNIFICANT CHANGE UP
HCV AB S/CO SERPL IA: 0.04 S/CO — SIGNIFICANT CHANGE UP (ref 0–0.99)
HCV AB SERPL-IMP: SIGNIFICANT CHANGE UP

## 2020-10-09 ENCOUNTER — APPOINTMENT (OUTPATIENT)
Dept: PAIN MANAGEMENT | Facility: CLINIC | Age: 75
End: 2020-10-09
Payer: MEDICARE

## 2020-10-09 VITALS
DIASTOLIC BLOOD PRESSURE: 97 MMHG | HEART RATE: 98 BPM | BODY MASS INDEX: 25.69 KG/M2 | SYSTOLIC BLOOD PRESSURE: 191 MMHG | WEIGHT: 145 LBS | HEIGHT: 63 IN

## 2020-10-09 VITALS — TEMPERATURE: 97.9 F

## 2020-10-09 DIAGNOSIS — M62.838 OTHER MUSCLE SPASM: ICD-10-CM

## 2020-10-09 PROCEDURE — 99213 OFFICE O/P EST LOW 20 MIN: CPT

## 2020-10-09 NOTE — ASSESSMENT
[FreeTextEntry1] : I reviewed patient with Dr Meneses -plan:\par trial of Gabapentin and lidocaine cream \par \par Pt has an appt Monday with Dr Meneses. \par Pt advised to follow up with PCP ASAP regarding elevated BP .\par \par

## 2020-10-09 NOTE — HISTORY OF PRESENT ILLNESS
[FreeTextEntry1] : Pt is here today for a new pain to left upper scapula area. The pain started earlier this week .SHe went to Chiropractor , which helped the pain for 2 days and then pain returned . It is rated 10/10 . \par Has been applying Voltaren gel , taking PO Ibuprofen - both offer little relief . \par \par Pt has TPI to lower back 1 month ago and explains that was very helpful for lower back. \par \par Upon palpation the area is tender to touch. Upon inspection her skin is clean , dry , intact - no evidence of trauma or inflammation. Trigger point area noted . \par \par Pt mentioned her Chiropractor said a rib maybe out of place ?? I will order chest x-ray. \par \par Pt also explains her LFTS are elevated - so medications are limited. \par I explained to pt her BP is elevated . She is aware and will consult her PCP.

## 2020-10-09 NOTE — PHYSICAL EXAM
[General Appearance - Alert] : alert [Oriented To Time, Place, And Person] : oriented to person, place, and time [Affect] : the affect was normal [Mood] : the mood was normal [Sclera] : the sclera and conjunctiva were normal [Paresis Pronator Drift Right-Sided] : no pronator drift on the right [Paresis Pronator Drift Left-Sided] : no pronator drift on the left [Motor Strength Upper Extremities Bilaterally] : strength was normal in both upper extremities [Motor Strength Lower Extremities Bilaterally] : strength was normal in both lower extremities [2+] : Brachioradialis left 2+ [FreeTextEntry8] : walks with a walker [] : no respiratory distress

## 2020-10-09 NOTE — REVIEW OF SYSTEMS
[Arthralgias] : arthralgias [Fever] : no fever [Chills] : no chills [Dizziness] : no dizziness [Fainting] : no fainting [Easy Bleeding] : no tendency for easy bleeding [Easy Bruising] : no tendency for easy bruising

## 2020-10-12 ENCOUNTER — APPOINTMENT (OUTPATIENT)
Dept: PAIN MANAGEMENT | Facility: CLINIC | Age: 75
End: 2020-10-12
Payer: MEDICARE

## 2020-10-12 VITALS
SYSTOLIC BLOOD PRESSURE: 183 MMHG | HEIGHT: 63 IN | DIASTOLIC BLOOD PRESSURE: 78 MMHG | BODY MASS INDEX: 25.69 KG/M2 | HEART RATE: 105 BPM | WEIGHT: 145 LBS

## 2020-10-12 VITALS — TEMPERATURE: 97.5 F

## 2020-10-12 PROCEDURE — 20552 NJX 1/MLT TRIGGER POINT 1/2: CPT

## 2020-10-12 PROCEDURE — 99213 OFFICE O/P EST LOW 20 MIN: CPT | Mod: 25

## 2020-10-12 NOTE — PHYSICAL EXAM
[General Appearance - Alert] : alert [General Appearance - Well Nourished] : well nourished [General Appearance - Well Developed] : well developed [Cranial Nerves Optic (II)] : visual acuity intact bilaterally,  visual fields full to confrontation, pupils equal round and reactive to light [Cranial Nerves Oculomotor (III)] : extraocular motion intact [Cranial Nerves Trigeminal (V)] : facial sensation intact symmetrically [Cranial Nerves Facial (VII)] : face symmetrical [Cranial Nerves Glossopharyngeal (IX)] : tongue and palate midline [Cranial Nerves Accessory (XI - Cranial And Spinal)] : head turning and shoulder shrug symmetric [Cranial Nerves Hypoglossal (XII)] : there was no tongue deviation with protrusion [2+] : Ankle jerk left 2+ [Sclera] : the sclera and conjunctiva were normal [PERRL With Normal Accommodation] : pupils were equal in size, round, reactive to light, with normal accommodation [Extraocular Movements] : extraocular movements were intact [] : no respiratory distress [Apical Impulse] : the apical impulse was normal [Heart Rate And Rhythm] : heart rate was normal and rhythm regular [Heart Sounds] : normal S1 and S2 [Murmurs] : no murmurs [Bowel Sounds] : normal bowel sounds [Abdomen Tenderness] : non-tender [No CVA Tenderness] : no ~M costovertebral angle tenderness [No Spinal Tenderness] : no spinal tenderness [Affect] : the affect was normal [Outer Ear] : the ears and nose were normal in appearance [Neck Appearance] : the appearance of the neck was normal [FreeTextEntry5] : Decreased hearing bilaterally [FreeTextEntry6] : noted moderate left sided foot drop [FreeTextEntry1] : Moderate tenderness at left low back, positive left sided ana cristina's test

## 2020-10-12 NOTE — PROCEDURE
[FreeTextEntry1] : Consent signed. 1% lidocaine 20 mg depomedol a total of 5 cc injected into the lumbar bilateral paraspinal region. No complications.

## 2020-10-12 NOTE — ASSESSMENT
[FreeTextEntry1] : 75F FBSS, now w/ left sided paraspinal lumbar tenderness, likely muscular, also w/ occasional left sided radiculopathy. \par \par Will proceed with local steroid injection at left lumbar paraspinal region\par Advised of AEs\par Will initiate Tramadol.AEs.

## 2020-10-12 NOTE — HISTORY OF PRESENT ILLNESS
[FreeTextEntry1] : 75F presents for eval of chronic low back pain worsening over last x2mo. Pt explains underwent L4/5, L5/S1 laminectomy/fusion two years ago. Now with refractory pain 10/10 pain. Pain is primarily left low back, achy, occasionally radiates down L>R to foot & big toe. Currently self tapering from gabapentin due to lack of efficacy and complaints of tinnitus and worsening of pt's Meniere's Disease/hearing loss. Also on diclofenac bid and Asper cream with mild relief. Acupuncture, ice, TENS provide no relief. Massages to left low back do provide relief. Lumbar MRI from 1yr ago demonstrates sig retrolisthesis at L4/5. Pt declines relief from numerous past spinal pain interventions (presumably epidurals).

## 2020-10-15 NOTE — PROCEDURE
[FreeTextEntry1] : Consent signed. 1% lidocaine 20 mg depomedrol injected into the left thoracic paraspinal region using a 1.25 inch needle.No complications

## 2020-10-15 NOTE — PHYSICAL EXAM
[General Appearance - Alert] : alert [Person] : oriented to person [Place] : oriented to place [Time] : oriented to time [Cranial Nerves Oculomotor (III)] : extraocular motion intact [Sclera] : the sclera and conjunctiva were normal [Outer Ear] : the ears and nose were normal in appearance [Musculoskeletal - Swelling] : no joint swelling seen [FreeTextEntry1] : left thoracic paraspinal region tenderness to palpation [] : no rash

## 2020-10-15 NOTE — HISTORY OF PRESENT ILLNESS
[FreeTextEntry1] : Patient reports left sided, non radiating, mid thoracic pain. She noted the pain has occurred over the past few weeks. Denies any noted triggered. No new medical problems.,

## 2020-10-15 NOTE — ASSESSMENT
[FreeTextEntry1] : Left paraspinal thoracic myalgia\par \par Will recommend local trigger point injection therapy.

## 2020-10-19 ENCOUNTER — APPOINTMENT (OUTPATIENT)
Dept: PAIN MANAGEMENT | Facility: CLINIC | Age: 75
End: 2020-10-19
Payer: MEDICARE

## 2020-10-19 VITALS
SYSTOLIC BLOOD PRESSURE: 193 MMHG | HEART RATE: 105 BPM | BODY MASS INDEX: 25.69 KG/M2 | WEIGHT: 145 LBS | DIASTOLIC BLOOD PRESSURE: 87 MMHG | HEIGHT: 63 IN

## 2020-10-19 DIAGNOSIS — M89.8X1 OTHER SPECIFIED DISORDERS OF BONE, SHOULDER: ICD-10-CM

## 2020-10-19 PROCEDURE — 99213 OFFICE O/P EST LOW 20 MIN: CPT

## 2020-10-19 NOTE — REVIEW OF SYSTEMS
[Fever] : no fever [Chills] : no chills [Chest Pain] : no chest pain [Palpitations] : no palpitations [Dizziness] : no dizziness [Fainting] : no fainting [Easy Bleeding] : no tendency for easy bleeding [Easy Bruising] : no tendency for easy bruising

## 2020-10-19 NOTE — HISTORY OF PRESENT ILLNESS
[FreeTextEntry1] : Pt is here today for a follow up visit. \par Continues to have left scapula pain. Did not have relief from TPI last week . Is taking 1 Gabapentin per day.\par Took Xanax last night and reports it helped with pain. \par \par Upon exam area is tender touch. A crusted over area noted - pt reports she burned herself with a heating pad.  \par Pt has full motion of left scapula and arm .\par I reviewed all above with Dr Meneses. \par Plan :xary left scapula . Pt explains she had a thoracic spine xary - she will get the report for me to review.\par

## 2020-10-19 NOTE — PHYSICAL EXAM
[General Appearance - Alert] : alert [Oriented To Time, Place, And Person] : oriented to person, place, and time [Affect] : the affect was normal [Mood] : the mood was normal [Motor Strength] : muscle strength was normal in all four extremities [Paresis Pronator Drift Right-Sided] : no pronator drift on the right [Paresis Pronator Drift Left-Sided] : no pronator drift on the left [Motor Strength Upper Extremities Bilaterally] : strength was normal in both upper extremities [Sclera] : the sclera and conjunctiva were normal [Neck Appearance] : the appearance of the neck was normal [Outer Ear] : the ears and nose were normal in appearance [Edema] : there was no peripheral edema

## 2020-10-19 NOTE — ASSESSMENT
[FreeTextEntry1] : Xary of affected area. \par Dr Meneses on site , billed incident to service. \par

## 2020-11-16 ENCOUNTER — APPOINTMENT (OUTPATIENT)
Dept: PAIN MANAGEMENT | Facility: CLINIC | Age: 75
End: 2020-11-16
Payer: MEDICARE

## 2020-11-16 VITALS — TEMPERATURE: 98.4 F

## 2020-11-16 PROCEDURE — 99214 OFFICE O/P EST MOD 30 MIN: CPT

## 2020-11-22 NOTE — HISTORY OF PRESENT ILLNESS
[FreeTextEntry1] : Patient reports the scapula pain is essentially gone res[pomsive to neurontin 100 mg bid. However, after taking neurontinCO left sided neck low back pain

## 2020-11-30 ENCOUNTER — APPOINTMENT (OUTPATIENT)
Dept: PAIN MANAGEMENT | Facility: CLINIC | Age: 75
End: 2020-11-30
Payer: MEDICARE

## 2020-11-30 VITALS — TEMPERATURE: 98.1 F

## 2020-11-30 PROCEDURE — 99214 OFFICE O/P EST MOD 30 MIN: CPT

## 2020-11-30 NOTE — HISTORY OF PRESENT ILLNESS
[FreeTextEntry1] : Patient reports being pain free for 2 days and then came back with a vengence. The pain feels like a knife like sensation. The pain is intermittent located along the left scapula region not triggered by movement. She has not been to therapy. She applies lidocaine cream which works intermittently.

## 2020-12-14 ENCOUNTER — APPOINTMENT (OUTPATIENT)
Dept: PAIN MANAGEMENT | Facility: CLINIC | Age: 75
End: 2020-12-14
Payer: MEDICARE

## 2020-12-14 VITALS — DIASTOLIC BLOOD PRESSURE: 91 MMHG | SYSTOLIC BLOOD PRESSURE: 170 MMHG

## 2020-12-14 VITALS
HEART RATE: 99 BPM | DIASTOLIC BLOOD PRESSURE: 97 MMHG | HEIGHT: 63 IN | WEIGHT: 146 LBS | BODY MASS INDEX: 25.87 KG/M2 | SYSTOLIC BLOOD PRESSURE: 174 MMHG

## 2020-12-14 VITALS — TEMPERATURE: 97.1 F

## 2020-12-14 PROCEDURE — 99213 OFFICE O/P EST LOW 20 MIN: CPT

## 2020-12-14 RX ORDER — GABAPENTIN 100 MG/1
100 CAPSULE ORAL
Qty: 90 | Refills: 1 | Status: DISCONTINUED | COMMUNITY
Start: 2020-10-09 | End: 2020-12-14

## 2020-12-14 NOTE — ASSESSMENT
[FreeTextEntry1] : Resume PT . \par Continue gabapentin.\par \par Dr Meneses on site , billed incident to service.

## 2020-12-14 NOTE — PHYSICAL EXAM
[General Appearance - Alert] : alert [General Appearance - Well-Appearing] : healthy appearing [Oriented To Time, Place, And Person] : oriented to person, place, and time [Affect] : the affect was normal [Mood] : the mood was normal [Motor Strength Lower Extremities Right] : there was weakness of the right lower extremity [Sclera] : the sclera and conjunctiva were normal [] : no respiratory distress [Respiration, Rhythm And Depth] : normal respiratory rhythm and effort [Edema] : there was no peripheral edema

## 2020-12-14 NOTE — HISTORY OF PRESENT ILLNESS
[FreeTextEntry1] : Pt returns today for a follow up visit . \par The left scapula pain is 80-90 percent resolved as per  pt. She continues to have lower back pain that radiates to right foot and has a right foot drop ( since surgery in 2018 ) .\par Pt has been walking with a rolling walker. \par Pt has tingling to right foot and leg - taking gabapentin 300mg / day . \par Requesting rx for PT today . \par \par Discussed elevated BP - pt denies chest pain , palpitations , SOB , dizziness. \par

## 2021-01-14 ENCOUNTER — APPOINTMENT (OUTPATIENT)
Dept: PAIN MANAGEMENT | Facility: CLINIC | Age: 76
End: 2021-01-14
Payer: MEDICARE

## 2021-01-14 VITALS
BODY MASS INDEX: 24.8 KG/M2 | WEIGHT: 140 LBS | DIASTOLIC BLOOD PRESSURE: 98 MMHG | HEIGHT: 63 IN | HEART RATE: 114 BPM | SYSTOLIC BLOOD PRESSURE: 177 MMHG

## 2021-01-14 PROCEDURE — 99214 OFFICE O/P EST MOD 30 MIN: CPT

## 2021-01-17 NOTE — PHYSICAL EXAM
[Neck Cervical Mass (___cm)] : no neck mass was observed [General Appearance - Alert] : alert [General Appearance - In No Acute Distress] : in no acute distress [General Appearance - Well Nourished] : well nourished [General Appearance - Well Developed] : well developed [Oriented To Time, Place, And Person] : oriented to person, place, and time [Impaired Insight] : insight and judgment were intact [Affect] : the affect was normal [Mood] : the mood was normal [Person] : oriented to person [Place] : oriented to place [Time] : oriented to time [Cranial Nerves Oculomotor (III)] : extraocular motion intact [Sensation Tactile Decrease] : light touch was intact [2+] : Brachioradialis left 2+ [Sclera] : the sclera and conjunctiva were normal [Outer Ear] : the ears and nose were normal in appearance [Neck Appearance] : the appearance of the neck was normal [Musculoskeletal - Swelling] : no joint swelling seen [] : no rash [FreeTextEntry6] : BL-foot weakness, Rt-foot>Lt.  [FreeTextEntry8] : antalgic gait using a walker  [FreeTextEntry1] : mild Lt-tricep tenderness

## 2021-01-17 NOTE — HISTORY OF PRESENT ILLNESS
[FreeTextEntry1] : Pt is a 75 y.o female is here for a follow up. She states that in her Lt-shoulder there is an area in her tricep that spasms when she leans backward, gets better when she is upright. \par There is always some form of pain in the Lt>Rt-low back. At night her legs hurt and feet tingle and hurt. she sleeps and then it gets better. Gabapentin helps sleep. \par Pt states she feels better not sure if it's due to Gabapentin. she was works hard with physical therapy. \par Pt had back surgery three yrs ago. \par

## 2021-02-11 ENCOUNTER — APPOINTMENT (OUTPATIENT)
Dept: PAIN MANAGEMENT | Facility: CLINIC | Age: 76
End: 2021-02-11
Payer: MEDICARE

## 2021-02-11 VITALS
HEART RATE: 98 BPM | SYSTOLIC BLOOD PRESSURE: 152 MMHG | DIASTOLIC BLOOD PRESSURE: 92 MMHG | HEIGHT: 63 IN | WEIGHT: 140 LBS | BODY MASS INDEX: 24.8 KG/M2

## 2021-02-11 PROCEDURE — 99214 OFFICE O/P EST MOD 30 MIN: CPT

## 2021-02-13 NOTE — ED PROVIDER NOTE - CPE EDP EYES NORM
DATE OF CONSULTATION:  01/22/2020    REFERRING Physician:  Anisa Ontiveros DO    CHIEF COMPLAINT:  Abdominal pain.    HISTORY OF PRESENT ILLNESS:  The patient is a 63-year-old female with known stage III pancreatic cancer, who presents to the emergency room with a 1-week history of right upper quadrant pain.  She has chronic pain related to her pancreatic cancer.  However, she reports this pain is much different and it is mainly in the right upper quadrant.  She reports it got worse after eating.  She also complains of being nauseous, but denies vomiting.  She denies fever or chills.  She also denies diarrhea.    PAST MEDICAL HISTORY:      ALLERGIES:  NONE.    MEDICATIONS:  Her medication reconciliation sheet was reviewed and is in the chart.    PAST MEDICAL HISTORY:  Significant for hypertension, hyperlipidemia, and stage III pancreatic cancer.    PAST SURGICAL HISTORY:  She had a MediPort placed for chemotherapy.    REVIEW OF SYSTEMS:  All 10 systems reviewed and are negative except for stated in history of present illness.    SOCIAL HISTORY:  She denies alcohol or tobacco use.    FAMILY HISTORY:  Negative for malignancy.    PHYSICAL EXAMINATION:    GENERAL:  Reveals a 63-year-old female, who is in no acute distress.     VITAL SIGNS:  Blood pressure is 100/60, pulse 70, and respirations 16.  She is afebrile.     SHEENT:  Reveals sclerae that are nonicteric.     LUNGS:  Clear.       HEART:  Regular rate and rhythm.       ABDOMEN:  Soft.  She has a positive Gonzalez's sign with exquisite tenderness over the right upper quadrant where the gallbladder is located.  There is no rebound tenderness.  She is not jaundiced.     EXTREMITIES:  Without calf tenderness or edema.     NEUROLOGICAL:  She is intact.    LABORATORY DATA:  Her liver functions and electrolytes are normal.  White count is 6.4 and hemoglobin is 9.7.  CT scan of the abdomen shows evidence of cavernous transformation of portal vein with gastric varices.   The patient also has small gallstones noted within the fundus of the gallbladder.  Her ultrasound did not show gallbladder wall thickening or pericholecystic fluid.    IMPRESSION:    1. Probable biliary colic versus acute cholecystitis.  2. 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  3. Stage III pancreatic cancer.    PLAN:  We will obtain a HIDA scan.  I strongly suspect she has biliary colic or possibly cholecystitis.  We will plan to perform a laparoscopic cholecystectomy tomorrow.  The patient is aware of the plan and risk.  However, the patient did receive Xarelto.  Therefore, we will probably have to wait until Friday to perform the laparoscopic cholecystectomy.  We will talk to her further about this after the results of the HIDA scan are known.  I have discontinued the Xarelto and started her on IV fluids.  All of her questions were answered.        ______________________________  Joseluis Sherman MD  [id number]      D:  01/22/2020 14:48:40  T:  01/22/2020 20:48:28   SPENCER/komal   Job:  [id number]              Electronically Signed On 01.23.2020 07:05  ___________________________________________________   Joseluis Sherman MD     normal...

## 2021-02-21 ENCOUNTER — RX RENEWAL (OUTPATIENT)
Age: 76
End: 2021-02-21

## 2021-03-16 ENCOUNTER — RX RENEWAL (OUTPATIENT)
Age: 76
End: 2021-03-16

## 2021-04-28 NOTE — H&P ADULT - REASON FOR ADMISSION
fever/chills/rash negative Affect and characteristics of appearance, verbalizations, behaviors are appropriate detailed exam

## 2021-05-13 ENCOUNTER — APPOINTMENT (OUTPATIENT)
Dept: PAIN MANAGEMENT | Facility: CLINIC | Age: 76
End: 2021-05-13
Payer: MEDICARE

## 2021-05-13 VITALS — TEMPERATURE: 97.4 F

## 2021-05-13 PROCEDURE — 99214 OFFICE O/P EST MOD 30 MIN: CPT

## 2021-05-19 NOTE — ASSESSMENT
[FreeTextEntry1] : Chronic pain syndrome\par Will introduce Cymbalta 20 mgs 1 i am can then increase bid\par Resume PT.

## 2021-05-19 NOTE — PHYSICAL EXAM
[General Appearance - Alert] : alert [Affect] : the affect was normal [Person] : oriented to person [Place] : oriented to place [Time] : oriented to time [Cranial Nerves Oculomotor (III)] : extraocular motion intact [FreeTextEntry8] : requiring a walker [Sclera] : the sclera and conjunctiva were normal [Outer Ear] : the ears and nose were normal in appearance [Neck Appearance] : the appearance of the neck was normal [] : no respiratory distress [FreeTextEntry1] : ABLE TO STAND FOR 10 SECONDS WITH DIFFICULTY

## 2021-05-19 NOTE — HISTORY OF PRESENT ILLNESS
[FreeTextEntry1] : Patient reports increase in pain since dcing gabapentin. Rheum suggested cymbalta for pain control. Received Prolea this morning. PT.

## 2021-07-14 ENCOUNTER — APPOINTMENT (OUTPATIENT)
Dept: PAIN MANAGEMENT | Facility: CLINIC | Age: 76
End: 2021-07-14

## 2021-07-27 ENCOUNTER — APPOINTMENT (OUTPATIENT)
Dept: SPINE | Facility: CLINIC | Age: 76
End: 2021-07-27
Payer: MEDICARE

## 2021-07-27 ENCOUNTER — NON-APPOINTMENT (OUTPATIENT)
Age: 76
End: 2021-07-27

## 2021-07-27 VITALS
HEIGHT: 63 IN | WEIGHT: 140 LBS | DIASTOLIC BLOOD PRESSURE: 95 MMHG | OXYGEN SATURATION: 93 % | BODY MASS INDEX: 24.8 KG/M2 | SYSTOLIC BLOOD PRESSURE: 176 MMHG | HEART RATE: 92 BPM

## 2021-07-27 DIAGNOSIS — M79.10 MYALGIA, UNSPECIFIED SITE: ICD-10-CM

## 2021-07-27 PROCEDURE — 99203 OFFICE O/P NEW LOW 30 MIN: CPT

## 2021-07-27 RX ORDER — ISOSORBIDE DINITRATE 10 MG/1
10 TABLET ORAL
Refills: 0 | Status: DISCONTINUED | COMMUNITY
End: 2021-07-27

## 2021-07-27 RX ORDER — GABAPENTIN 100 MG/1
100 CAPSULE ORAL
Qty: 60 | Refills: 5 | Status: DISCONTINUED | COMMUNITY
Start: 2021-01-14 | End: 2021-07-27

## 2021-07-27 RX ORDER — DILTIAZEM HYDROCHLORIDE 360 MG/1
360 CAPSULE, COATED, EXTENDED RELEASE ORAL
Refills: 0 | Status: ACTIVE | COMMUNITY

## 2021-07-27 RX ORDER — DULOXETINE HYDROCHLORIDE 20 MG/1
20 CAPSULE, DELAYED RELEASE PELLETS ORAL
Qty: 60 | Refills: 0 | Status: DISCONTINUED | COMMUNITY
Start: 2021-05-13 | End: 2021-07-27

## 2021-07-27 RX ORDER — OMEPRAZOLE 20 MG/1
20 TABLET, DELAYED RELEASE ORAL
Refills: 0 | Status: ACTIVE | COMMUNITY

## 2021-07-27 RX ORDER — DILTIAZEM HYDROCHLORIDE 60 MG/1
TABLET, COATED ORAL
Refills: 0 | Status: DISCONTINUED | COMMUNITY
End: 2021-07-27

## 2021-07-27 RX ORDER — CHROMIUM 200 MCG
TABLET ORAL
Refills: 0 | Status: ACTIVE | COMMUNITY

## 2021-07-27 RX ORDER — METOPROLOL TARTRATE 75 MG/1
TABLET, FILM COATED ORAL
Refills: 0 | Status: DISCONTINUED | COMMUNITY
End: 2021-07-27

## 2021-07-27 NOTE — REVIEW OF SYSTEMS
[Leg Weakness] : leg weakness [Poor Coordination] : poor coordination [Numbness] : numbness [Tingling] : tingling [Abnormal Sensation] : an abnormal sensation [Difficulty Walking] : difficulty walking [Ataxia] : ataxia [Negative] : Heme/Lymph [de-identified] : back pain

## 2021-07-27 NOTE — REASON FOR VISIT
[New Patient Visit] : a new patient visit [Referred By: _________] : Patient was referred by ELA [Spouse] : spouse [FreeTextEntry1] : Chronic lumbar radiculopathy and bilateral foot drop

## 2021-07-27 NOTE — PHYSICAL EXAM
[General Appearance - Alert] : alert [General Appearance - In No Acute Distress] : in no acute distress [Oriented To Time, Place, And Person] : oriented to person, place, and time [Impaired Insight] : insight and judgment were intact [Affect] : the affect was normal [Person] : oriented to person [Place] : oriented to place [Time] : oriented to time [Short Term Intact] : short term memory intact [Remote Intact] : remote memory intact [Registration Intact] : recent registration memory intact [Span Intact] : the attention span was normal [Concentration Intact] : normal concentrating ability [Visual Intact] : visual attention was ~T not ~L decreased [Fluency] : fluency intact [Comprehension] : comprehension intact [Reading] : reading intact [Current Events] : adequate knowledge of current events [Past History] : adequate knowledge of personal past history [Vocabulary] : adequate range of vocabulary [Cranial Nerves Optic (II)] : visual acuity intact bilaterally,  pupils equal round and reactive to light [Cranial Nerves Facial (VII)] : face symmetrical [Cranial Nerves Oculomotor (III)] : extraocular motion intact [Cranial Nerves Vestibulocochlear (VIII)] : hearing was intact bilaterally [Cranial Nerves Accessory (XI - Cranial And Spinal)] : head turning and shoulder shrug symmetric [Involuntary Movements] : no involuntary movements were seen [Sensation Tactile Decrease] : light touch was intact [Limited Balance] : the patient's balance was impaired [Tremor] : no tremor present [Coordination - Dysmetria Impaired Finger-to-Nose Bilateral] : not present [Coordination - Dysmetria Impaired Heel-to-Shin Bilateral] : present bilaterally [2+] : Patella left 2+ [0] : Ankle jerk left 0 [___] : absent on the left [Doshi] : Doshi's sign was not demonstrated [FreeTextEntry6] : Bilateral  dorsiflexion left side 4/5, on right 3/5 [FreeTextEntry8] : Ataxic , spastic gait  [Full ROM] : full ROM [No Visual Abnormalities] : no visible abnormailities [Straight-Leg Raise Test - Left] : straight leg raise of the left leg was negative [Straight-Leg Raise Test - Right] : straight leg raise of the right leg was positive [Able to toe walk] : the patient was not able to toe walk [Able to heel walk] : the patient was not able to heel walk [Sclera] : the sclera and conjunctiva were normal [Outer Ear] : the ears and nose were normal in appearance [Neck Appearance] : the appearance of the neck was normal [] : no respiratory distress [FreeTextEntry1] : spastic gait with assistance  [Skin Color & Pigmentation] : normal skin color and pigmentation

## 2021-07-27 NOTE — HISTORY OF PRESENT ILLNESS
[> 3 months] : more  than 3 months [FreeTextEntry1] : back and left leg pain  [de-identified] : Today I had the pleasure in seeing Ms Grubbs for a neurosurgical consult with a long standing history of lower back and left  leg pain.  She had undergone a L 4 L5 fusion and laminectomy of L5 S 1  in [facility] by Dr. Jose Portillo in 2018.  After surgery she reports bilateral foot drop, right foot worse.  She never fully recovered from the surgery and continues to have ongoing back pain and  left leg pain with paresthesias.  She has chronic bilateral foot drop and requires a walker for ambulation.     Her walking is progressively worsening and her foot drops have somewhat improved with years of PT.   She has completed 9 lumbar epidural injections. \par \par Her pain is located in her lower spine and radiates into her left leg and foot.  She is on  900 mg of Gabapentin a day.  Her legs are numb.  She has no urine or stool incontinence.  She no longer can golf.  Walking distances has become impossible.  In addition, she had undergone THR on the left and a revision for hardware failure in 2015 and 2016.  her last lumbar MRI from 2019 postop shows severe  spondylolisthesis and stenosis at multiple levels.

## 2021-07-27 NOTE — ASSESSMENT
[FreeTextEntry1] : 76 year old female with long standing lumbar radiculopathy on the left side and bilateral foot drop.  S/P lumbar fusion L 4 L5.  MRI form 2019 shows severe multi-level stenosis and  spondylolisthesis .  A scoliosis image was ordered and a flexion/extension of the lumbar region.  A updated lumbar MRI was ordered to compare stenosis and progression.  She will bring the old MRI to the office for a formal evaluation once the images are available for review.   Continue PT.  A referral to physiatrist was provided for Dr Indra Aldana.   In two weeks she will follow up .\par \par \par CC:\par Eliseo Marie MD

## 2021-07-30 ENCOUNTER — APPOINTMENT (OUTPATIENT)
Dept: ORTHOPEDIC SURGERY | Facility: CLINIC | Age: 76
End: 2021-07-30

## 2021-07-30 ENCOUNTER — APPOINTMENT (OUTPATIENT)
Dept: RADIOLOGY | Facility: CLINIC | Age: 76
End: 2021-07-30

## 2021-08-02 ENCOUNTER — APPOINTMENT (OUTPATIENT)
Dept: RADIOLOGY | Facility: CLINIC | Age: 76
End: 2021-08-02
Payer: MEDICARE

## 2021-08-02 ENCOUNTER — OUTPATIENT (OUTPATIENT)
Dept: OUTPATIENT SERVICES | Facility: HOSPITAL | Age: 76
LOS: 1 days | End: 2021-08-02
Payer: MEDICARE

## 2021-08-02 DIAGNOSIS — M43.16 SPONDYLOLISTHESIS, LUMBAR REGION: ICD-10-CM

## 2021-08-02 DIAGNOSIS — Z98.89 OTHER SPECIFIED POSTPROCEDURAL STATES: Chronic | ICD-10-CM

## 2021-08-02 PROCEDURE — 72084 X-RAY EXAM ENTIRE SPI 6/> VW: CPT

## 2021-08-02 PROCEDURE — 72110 X-RAY EXAM L-2 SPINE 4/>VWS: CPT

## 2021-08-02 PROCEDURE — 72084 X-RAY EXAM ENTIRE SPI 6/> VW: CPT | Mod: 26

## 2021-08-02 PROCEDURE — 72082 X-RAY EXAM ENTIRE SPI 2/3 VW: CPT

## 2021-08-03 ENCOUNTER — APPOINTMENT (OUTPATIENT)
Dept: MRI IMAGING | Facility: CLINIC | Age: 76
End: 2021-08-03
Payer: MEDICARE

## 2021-08-03 ENCOUNTER — OUTPATIENT (OUTPATIENT)
Dept: OUTPATIENT SERVICES | Facility: HOSPITAL | Age: 76
LOS: 1 days | End: 2021-08-03
Payer: MEDICARE

## 2021-08-03 DIAGNOSIS — M43.16 SPONDYLOLISTHESIS, LUMBAR REGION: ICD-10-CM

## 2021-08-03 DIAGNOSIS — Z98.89 OTHER SPECIFIED POSTPROCEDURAL STATES: Chronic | ICD-10-CM

## 2021-08-03 DIAGNOSIS — M48.061 SPINAL STENOSIS, LUMBAR REGION WITHOUT NEUROGENIC CLAUDICATION: ICD-10-CM

## 2021-08-03 PROCEDURE — 72148 MRI LUMBAR SPINE W/O DYE: CPT | Mod: 26,MH

## 2021-08-03 PROCEDURE — 72148 MRI LUMBAR SPINE W/O DYE: CPT

## 2021-08-10 ENCOUNTER — APPOINTMENT (OUTPATIENT)
Dept: PAIN MANAGEMENT | Facility: CLINIC | Age: 76
End: 2021-08-10
Payer: MEDICARE

## 2021-08-10 PROCEDURE — 99214 OFFICE O/P EST MOD 30 MIN: CPT

## 2021-08-12 ENCOUNTER — NON-APPOINTMENT (OUTPATIENT)
Age: 76
End: 2021-08-12

## 2021-08-12 ENCOUNTER — APPOINTMENT (OUTPATIENT)
Dept: SPINE | Facility: CLINIC | Age: 76
End: 2021-08-12
Payer: MEDICARE

## 2021-08-12 VITALS
OXYGEN SATURATION: 95 % | HEART RATE: 102 BPM | BODY MASS INDEX: 24.8 KG/M2 | DIASTOLIC BLOOD PRESSURE: 86 MMHG | WEIGHT: 140 LBS | SYSTOLIC BLOOD PRESSURE: 149 MMHG | HEIGHT: 63 IN

## 2021-08-12 DIAGNOSIS — Z82.61 FAMILY HISTORY OF ARTHRITIS: ICD-10-CM

## 2021-08-12 DIAGNOSIS — Z80.52 FAMILY HISTORY OF MALIGNANT NEOPLASM OF BLADDER: ICD-10-CM

## 2021-08-12 DIAGNOSIS — Q76.49 OTHER CONGENITAL MALFORMATIONS OF SPINE, NOT ASSOCIATED WITH SCOLIOSIS: ICD-10-CM

## 2021-08-12 DIAGNOSIS — Z78.9 OTHER SPECIFIED HEALTH STATUS: ICD-10-CM

## 2021-08-12 PROCEDURE — 99213 OFFICE O/P EST LOW 20 MIN: CPT

## 2021-08-12 NOTE — REASON FOR VISIT
[Follow-Up: _____] : a [unfilled] follow-up visit [Family Member] : family member [FreeTextEntry1] : Ms. Grubbs is here for a follow up with a long standing history of lumbar radiculopathy and bilateral foot drop.    She failed a L 4 L 5 lumbar fusion in 2018 and has progressive lumbar radiculopathy.  Tingling and numbness with dysesthesias is present in her legs.  Her pain is  8/10.  She has difficulty walking and performing her activities.  She is on Gabapentin at 1200 mg a day.  She walks with a walker.   She has  three sessions of PT left for this year.

## 2021-08-12 NOTE — ASSESSMENT
[FreeTextEntry1] : 77 yo female with long standing lumbar radiculopathy and neuropathic pain   S/P lumbar fusion L 4 L5.  MRI form 2019 shows severe multi-level stenosis and  spondylolisthesis Re imaging pending/\par Spoke with patient at length. She decided to go back to neurontin since it provides her the most benefit.despite potential AEs.\par \par She does not want and spine surgery. We discussed potential for neurostim trial. At this time, she would prefer to stay on neurontin.\par She is to see Neurosurgery tis week. \par

## 2021-08-12 NOTE — PHYSICAL EXAM
[Person] : oriented to person [Place] : oriented to place [Time] : oriented to time [Short Term Intact] : short term memory intact [Remote Intact] : remote memory intact [Span Intact] : the attention span was normal [Concentration Intact] : normal concentrating ability [Fluency] : fluency intact [Comprehension] : comprehension intact [Current Events] : adequate knowledge of current events [Past History] : adequate knowledge of personal past history [Vocabulary] : adequate range of vocabulary [Cranial Nerves Optic (II)] : visual acuity intact bilaterally,  pupils equal round and reactive to light [Cranial Nerves Oculomotor (III)] : extraocular motion intact [Cranial Nerves Trigeminal (V)] : facial sensation intact symmetrically [Cranial Nerves Facial (VII)] : face symmetrical [Cranial Nerves Vestibulocochlear (VIII)] : hearing was intact bilaterally [Cranial Nerves Glossopharyngeal (IX)] : tongue and palate midline [Cranial Nerves Accessory (XI - Cranial And Spinal)] : head turning and shoulder shrug symmetric [Cranial Nerves Hypoglossal (XII)] : there was no tongue deviation with protrusion [Motor Tone] : muscle tone was normal in all four extremities [No Muscle Atrophy] : normal bulk in all four extremities [Abnormal Walk] : normal gait [Balance] : balance was intact [Past-pointing] : there was no past-pointing [Tremor] : no tremor present [1+] : Ankle jerk left 1+ [Doshi] : Doshi's sign was not demonstrated [FreeTextEntry6] : bilateral footdrop  0/5 right, 2/5 left [FreeTextEntry7] : patchy decreased sensation lower extremity [FreeTextEntry1] : well-healed midline 10 cm incision

## 2021-08-12 NOTE — PHYSICAL EXAM
[General Appearance - Alert] : alert [Affect] : the affect was normal [Sclera] : the sclera and conjunctiva were normal [Outer Ear] : the ears and nose were normal in appearance [Neck Appearance] : the appearance of the neck was normal [] : no respiratory distress [Person] : oriented to person [Place] : oriented to place [Time] : oriented to time [Short Term Intact] : short term memory intact [Remote Intact] : remote memory intact [Span Intact] : the attention span was normal [Concentration Intact] : normal concentrating ability [Fluency] : fluency intact [Comprehension] : comprehension intact [Current Events] : adequate knowledge of current events [Past History] : adequate knowledge of personal past history [Cranial Nerves Optic (II)] : visual acuity intact bilaterally,  pupils equal round and reactive to light [Cranial Nerves Oculomotor (III)] : extraocular motion intact [Cranial Nerves Facial (VII)] : face symmetrical [Cranial Nerves Vestibulocochlear (VIII)] : hearing was intact bilaterally [Cranial Nerves Accessory (XI - Cranial And Spinal)] : head turning and shoulder shrug symmetric [Involuntary Movements] : no involuntary movements were seen [No Visual Abnormalities] : no visible abnormailities [FreeTextEntry6] : Bilateral  dorsiflexion left side 4/5, on right 3/5 [FreeTextEntry8] : requiring a walker; able to stand wo assistance for 30 sec [FreeTextEntry1] : ABLE TO STAND FOR 10 SECONDS WITH DIFFICULTY [Past-pointing] : past-pointing was present [Tremor] : no tremor present [___] : absent on the left [Doshi] : Doshi's sign was not demonstrated [Straight-Leg Raise Test - Left] : straight leg raise of the left leg was negative

## 2021-08-12 NOTE — REVIEW OF SYSTEMS
[Leg Weakness] : leg weakness [Poor Coordination] : poor coordination [Numbness] : numbness [Tingling] : tingling [Difficulty Walking] : difficulty walking [Ataxia] : ataxia [Negative] : Heme/Lymph

## 2021-08-12 NOTE — DATA REVIEWED
[de-identified] : Lumbar MRI form [facility]  8/2/2021 [de-identified] : Scoliosis xray from [facility] 8/2/2021

## 2021-08-12 NOTE — ASSESSMENT
[FreeTextEntry1] : \par 76 year old female with long standing lumbar stenosis and  failed lumbar fusion in 2018.    Several issues exist with neuro-images including scoliosis, sagittal imbalance,  spondylolisthesis and stenosis.  There would need to be an extensive lumbar surgery performed to re-align the spine and undergo an extensive  lumbar fusion.  She will continue PT .  A CT of the lumbar region  will be obtained if she opts for surgical intervention.    Stay on Gabapentin and may increase the doses under supervision.  Her main issue is related to neuropathy and her back pain is secondary.   She will return if her symptoms worsen.

## 2021-08-12 NOTE — CONSULT LETTER
[Dear  ___] : Dear  [unfilled], [Consult Letter:] : I had the pleasure of evaluating your patient, [unfilled]. [Please see my note below.] : Please see my note below. [Consult Closing:] : Thank you very much for allowing me to participate in the care of this patient.  If you have any questions, please do not hesitate to contact me. [Sincerely,] : Sincerely, [FreeTextEntry3] : Mark Burkett MD\par Chief of Neurosurgery [facility]\par [facility]\par

## 2021-08-12 NOTE — HISTORY OF PRESENT ILLNESS
[FreeTextEntry1] : Since last visit, patient continues to co persistent back left sided with radiation into the anterior portion of her left leg. Cymbalta did not work and decided to go back to Neurontin 300 mgs tid - which has helped more than anything else but has led to increase swelling in both LEs.   Notes tingling / burning in both lower extremities worse at night Intermittently.\par \par \par Overall, feels less pain, stronger able to walk 15 feet with cane and in controlled setting. Notes standing for 30 seconds \par \par \par

## 2021-08-24 NOTE — ED PROVIDER NOTE - EKG #1 DATE/TIME
Physical Therapy     Referred by: Juan Farias MD; Medical Diagnosis (from order):    Diagnosis Information      Diagnosis    726.13 (ICD-9-CM) - M75.112 (ICD-10-CM) - Incomplete rotator cuff tear or rupture of left shoulder, not specified as traumatic                Daily Treatment Note    Visit:  17   Treatment Diagnosis: S/P L shoulder RTC repair, symptoms with increased pain/symptoms, impaired posture, impaired range of motion, impaired strength, impaired scapulohumeral rhythm, impaired activity tolerance  Next referring provider appointment: 9/13/2021      Employer:  [employer]  [employer] [facility] [facility] [place] [address]  Current Work Status: off work due to current condition  Full Duty Work Demands: light (10 - 20 lbs); sitting >50% of the day, standing >50% of the day, rotational/twisting motions and chest height lifting  Diagnosis Precautions: Follow protocol for post op RTC repair  Patient alert and oriented X3.    SUBJECTIVE                                                                                                             8/24/21  9 weeks post op.  Reports 3-4/10 pain today. Took pain meds early today.        6/28/21 Evaluation:  Pt is S/p LEFT Shoulder Arthroscopic Rotator Cuff Repair (DOS: 6/23/2021) Pt's original injury was on  November 1, 2019, fell on ice onto the back of his head and left shoulder. He failed conservative treatment with PT early 2020, underwent surgery 9/11/2020 - LEFT Shoulder Arthroscopic Distal Clavicle Resection, Acromioplasty, Debridement of Rotator Cuff and Biceps Tenotomy, He completed rehab in November 2020 and was back at work as  on light duty(no overhead activities). Started to have pains in L shoulder beginning of the year, received cortisone injection 1/4/21(subacromial bursa) which lasted 5 days of relief, received another injection on 1/21/21 (AC joint)but pain persisted. MRI shows possible tear so decided to have RTC  repair.  Current functional limitations: Pt still on precautions post surgery and unable to use L arm.  Pain / Symptoms:  Pain/symptom is: constant  Pain rating (out of 10): Current: 4     OBJECTIVE                                                                                                                     Range of Motion (ROM)   (degrees unless noted; active unless noted; norms in ( ); negative=lacking to 0, positive=beyond 0)   WFL: LUE, RUE, except as noted  Shoulder:     - Flexion (180):        • Left: 130 pain  Passive: 150     - Abduction (180):        • Left: 110 pain  Passive: 120 pain     - Internal Rotation at 45°:         • Left: 75    - External Rotation at 45°:         • Left: 75 pain    Strength  (out of 5 unless noted, standard test position unless noted, lbs tested with hand held dynamometer)   Not Tested:     - Left shoulder due to restrictions, pain  Shoulder:     - Flexion:         • Left: 3-     - Extension:           • Left: 4-    - Abduction:        • Left: 3-     - Scaption:         • Left:  3-    - Adduction:           • Left: 4-    - Internal Rotation:          • Left (at 45°): 3    - External Rotation:         • Left (at 45°): 3-      Palpation:   Comments / Details: Increased muscle guarding and tone to L upper quadrant           TREATMENT                                                                                                                  Therapeutic Exercise:  Pt education on diagnosis and POC  *sh shrugs with BUE elevated x 10  *scap squeezes x 10  P[place]ley AAROM flexion and scaption  *Cane flexion 10 x 5\" in front of mirror   *Supine ceiling press ups 2 x 12  *Side lying ER 2 x 12   *Side lying scaption 2 x 12  *submax isometric, ER/IR 5 x10\"  *submax isometric EXT/FLEX. 5 x 10\"  Prone scapular rowing/retraction   Prone shoulder extension  *elbow winging   RPM x 8 min    Neuromuscular Re-Education:  AAROM to L shoulder for flex/scaption  STM to L shoulder/scapula  to release soft tissue restrictions  Neuromuscular reeducation for proper muscle activation requiring mod tactile and verbal cues.     Home Exercise Program: *above indicates provided as part of home exercise program    Access Code: [email or web address]  URL: [email or web address]  Date: 08/20/2021  Prepared by: Ed Martin    Exercises  · Supine Alternating Shoulder Flexion - 1 x daily - 7 x weekly - 2 sets - 10 reps  · Sidelying Shoulder Scaption - 1 x daily - 7 x weekly - 2 sets - 10 reps  · Sidelying Shoulder External Rotation - 1 x daily - 7 x weekly - 2 sets - 10 reps  · Standing Shoulder Flexion Wall Walk - 1 x daily - 7 x weekly - 1-2 sets - 10 reps  · Prone Shoulder Extension - Single Arm with Dumbbell - 1 x daily - 7 x weekly - 2 sets - 10 reps  · Prone Shoulder Row - 1 x daily - 7 x weekly - 2 sets - 10 reps  · Prone Shoulder Horizontal Abduction - 1 x daily - 7 x weekly - 2 sets - 10 reps          ASSESSMENT                                                                                                           61 year old male patient has reported functional limitations listed above impacted by signs and symptoms consistent with below   • Involved:       - S/P L shoulder RTC repair   • Symptoms/impairments: increased pain/symptoms, impaired posture, impaired range of motion, impaired strength, impaired scapulohumeral rhythm and impaired activity tolerance    8/24/21:  Steady progression but has tendency to substitute lev scapula and biceps for shoulder flexion. Mod tactile and verbal cues required to facilitate correct muscle activation. Reports he is taking pain meds for L knee which seems to help his L shoulder pain as well.  Sleeping is better.  Continue and progress as tolerated.        Problem List  [employer] 97.73% perceived disability  L shoulder dysfunction  Sleep disturbance  Unable to drive  Unable to work  Pain/symptoms after session (out of 10): 3    Patient Education:   Results  of above outlined education: Verbalizes understanding      PLAN                                                                                                                           Suggestions for next session as indicated: Progress per plan of care      GOALS                                                                                                                           Long Term Goals: to be met by end of plan of care  1. Decrease patient's perceived disability to less than 20%. Status: progressing/ongoing  2. Increase L shoulder motions/strength motions to enable ease with function/mobility. Status: progressing/ongoing  3. Independent with HEP/self management techniques. Status: progressing/ongoing  4. Return to prior level of function including work full duty.  Status: progressing/ongoing      Therapy procedure time and total treatment time can be found documented on the Time Entry flowsheet   02-Oct-2020 16:33

## 2021-09-29 ENCOUNTER — APPOINTMENT (OUTPATIENT)
Dept: PAIN MANAGEMENT | Facility: CLINIC | Age: 76
End: 2021-09-29
Payer: MEDICARE

## 2021-09-29 VITALS — SYSTOLIC BLOOD PRESSURE: 150 MMHG | DIASTOLIC BLOOD PRESSURE: 90 MMHG | HEART RATE: 96 BPM | HEIGHT: 63 IN

## 2021-09-29 PROCEDURE — 99214 OFFICE O/P EST MOD 30 MIN: CPT

## 2021-10-01 NOTE — HISTORY OF PRESENT ILLNESS
[FreeTextEntry1] : Since last visit, patient reports neurontin 1200 mgs qd in divided doses. However, still co dysesthesias in both ankles/feet. Restarted PT...Notes more steps in ambulation.  Seein someone for AFO.

## 2021-10-01 NOTE — PHYSICAL EXAM
[General Appearance - Alert] : alert [Affect] : the affect was normal [Sclera] : the sclera and conjunctiva were normal [Outer Ear] : the ears and nose were normal in appearance [Neck Appearance] : the appearance of the neck was normal [] : no respiratory distress [Person] : oriented to person [Place] : oriented to place [Time] : oriented to time [Short Term Intact] : short term memory intact [Remote Intact] : remote memory intact [Span Intact] : the attention span was normal [Concentration Intact] : normal concentrating ability [Fluency] : fluency intact [Comprehension] : comprehension intact [Current Events] : adequate knowledge of current events [Past History] : adequate knowledge of personal past history [Cranial Nerves Optic (II)] : visual acuity intact bilaterally,  pupils equal round and reactive to light [Cranial Nerves Oculomotor (III)] : extraocular motion intact [Cranial Nerves Facial (VII)] : face symmetrical [Cranial Nerves Vestibulocochlear (VIII)] : hearing was intact bilaterally [Cranial Nerves Accessory (XI - Cranial And Spinal)] : head turning and shoulder shrug symmetric [Involuntary Movements] : no involuntary movements were seen [Past-pointing] : past-pointing was present [No Visual Abnormalities] : no visible abnormailities [FreeTextEntry6] : Bilateral  dorsiflexion left side 4/5, on right 3/5 [FreeTextEntry8] : requiring a walker; able to stand wo assistance for 30 sec [FreeTextEntry1] : ABLE TO STAND FOR 10 SECONDS WITH DIFFICULTY [Tremor] : no tremor present [___] : absent on the left [Doshi] : Doshi's sign was not demonstrated [Straight-Leg Raise Test - Left] : straight leg raise of the left leg was negative

## 2021-10-01 NOTE — ASSESSMENT
[FreeTextEntry1] : 77 yo female with long standing lumbar radiculopathy and neuropathic pain   \par Spoke with patient at length. Continue neurontin 1200 mgs qd... does not want to increase dosage at this time. \par \par Continue PT\par \par

## 2022-04-20 ENCOUNTER — RX RENEWAL (OUTPATIENT)
Age: 77
End: 2022-04-20

## 2022-05-17 ENCOUNTER — APPOINTMENT (OUTPATIENT)
Dept: NEUROSURGERY | Facility: CLINIC | Age: 77
End: 2022-05-17
Payer: MEDICARE

## 2022-05-17 VITALS
OXYGEN SATURATION: 96 % | SYSTOLIC BLOOD PRESSURE: 189 MMHG | HEIGHT: 63 IN | HEART RATE: 94 BPM | WEIGHT: 140 LBS | BODY MASS INDEX: 24.8 KG/M2 | DIASTOLIC BLOOD PRESSURE: 89 MMHG | TEMPERATURE: 98.2 F

## 2022-05-17 PROCEDURE — 99214 OFFICE O/P EST MOD 30 MIN: CPT | Mod: 57

## 2022-05-20 NOTE — DATA REVIEWED
[de-identified] : \par IMPRESSION:  Grade 1 posterior spondylolisthesis at L1-2 and L2-3 and grade 1 anterior spondylolisthesis at L4-5 anterospondylolisthesis. There is a dextroscoliosis with apex at L3-4. There is a LEFT lateral subluxation at L2-3. Posterior fusion noted at L4-5 with pedicle screws and fusion rods, and laminectomies at L3-L5.   Severe degenerative disc disease and spondylosis at L1-2 through L5-S1 with disc bulges at flatten the ventral thecal sac and narrow the BILATERAL neural foramina with severe central stenosis at L2-3. The thecal sac appears decompressed at L3-4 and L4-5. Moderate central stenosis at L5-S1 on a degenerative basis. Large LEFT L1-2 foraminal disc herniation narrows the LEFT neural foramen and compresses the exiting LEFT L2 nerve root. Small LEFT L2-3 paracentral disc herniation compresses the LEFT lateral recess and the exiting LEFT L2 and descending LEFT L3 nerve roots. Large LEFT foraminal and lateral L3-4 disc herniation narrows the LEFT neural foramen. LEFT foraminal L4-5 and RIGHT foraminal L5-S1 osteophytic ridge/disc complexes narrow the respective LEFT and RIGHT neural foramina.  Small central disc herniation noted at T9-T10 which abuts the ventral cord. Tiny central disc herniations at T11-T12, and T12-L1 without cord impingement.\par \par \par \par \par \par \par  \par \par         \par

## 2022-05-20 NOTE — HISTORY OF PRESENT ILLNESS
[de-identified] : The patient reports that she has been suffering from low back pain that radiates into her left leg and foot for multiple years. She had undergone L4-5 laminectomy and fusion in 2018. She reports that having any improvement in her back pain and started to have foot drop and increased pain. She is ambulating with a walker. She has tried medication, epidural injections, physical therapy but none is effective. \par \par MRI lumbar spine done on 8/2/2021 showed multilevel severe degenerative disc disease and central canal stenosis with spondylolisthesis and scoliosis. She has been following up with Dr. Meneses who then advised her to come here for an evaluation for spinal cord stimulator trial/ placement.

## 2022-05-20 NOTE — PHYSICAL EXAM
[General Appearance - Alert] : alert [General Appearance - In No Acute Distress] : in no acute distress [Oriented To Time, Place, And Person] : oriented to person, place, and time [Impaired Insight] : insight and judgment were intact [Affect] : the affect was normal [Motor Tone] : muscle tone was normal in all four extremities [Motor Strength] : muscle strength was normal in all four extremities [No Muscle Atrophy] : normal bulk in all four extremities [4] : L3 quadriceps 4/5 [2] : L4/5 ankle dorsiflexors 2/5 [Limited Balance] : the patient's balance was impaired [1+] : Brachioradialis left 1+ [2+] : Patella left 2+ [Antalgic] : antalgic [Able to toe walk] : the patient was able to toe walk [Able to heel walk] : the patient was able to heel walk [Sclera] : the sclera and conjunctiva were normal [PERRL With Normal Accommodation] : pupils were equal in size, round, reactive to light, with normal accommodation [Extraocular Movements] : extraocular movements were intact [Full Visual Field] : full visual field [Outer Ear] : the ears and nose were normal in appearance [Neck Appearance] : the appearance of the neck was normal [Exaggerated Use Of Accessory Muscles For Inspiration] : no accessory muscle use [Heart Rate And Rhythm] : heart rate was normal and rhythm regular [Full Pulse] : the pedal pulses are present [Edema] : there was no peripheral edema [Abdomen Mass (___ Cm)] : no abdominal mass palpated [No Spinal Tenderness] : no spinal tenderness [Involuntary Movements] : no involuntary movements were seen [Musculoskeletal - Swelling] : no joint swelling seen [Skin Color & Pigmentation] : normal skin color and pigmentation [Skin Turgor] : normal skin turgor [] : no rash [FreeTextEntry8] : ambulating with a walker.  [Straight-Leg Raise Test - Left] : straight leg raise of the left leg was negative [Straight-Leg Raise Test - Right] : straight leg raise  of the right leg was negative [FreeTextEntry1] : hard of hearing

## 2022-05-20 NOTE — ASSESSMENT
[FreeTextEntry1] : 77 years old woman with long standing lumbar stenosis and failed lumbar fusion with chronic severe back pain and lower extremity pain, parasthenia and weakness. She has had failed conservative pain management including physical therapy, injections and pain medications. Pain has become constant and is interfering with her quality of life. She is a good candidate for a spinal cord stimulator trial.\par \par Plan:\par - Medical clearance\par - CT thoracic and lumbar spine\par - SCS trial with [employer]. Risks and benefits discussed and she wishes to proceed.\par \par

## 2022-07-14 ENCOUNTER — APPOINTMENT (OUTPATIENT)
Dept: NEUROLOGY | Facility: CLINIC | Age: 77
End: 2022-07-14

## 2022-07-14 DIAGNOSIS — F43.21 ADJUSTMENT DISORDER WITH DEPRESSED MOOD: ICD-10-CM

## 2022-07-14 PROCEDURE — 90791 PSYCH DIAGNOSTIC EVALUATION: CPT

## 2022-07-28 PROBLEM — F43.21 ADJUSTMENT DISORDER WITH DEPRESSED MOOD: Status: ACTIVE | Noted: 2022-07-28

## 2022-08-03 ENCOUNTER — OUTPATIENT (OUTPATIENT)
Dept: OUTPATIENT SERVICES | Facility: HOSPITAL | Age: 77
LOS: 1 days | End: 2022-08-03
Payer: MEDICARE

## 2022-08-03 ENCOUNTER — APPOINTMENT (OUTPATIENT)
Dept: CT IMAGING | Facility: CLINIC | Age: 77
End: 2022-08-03

## 2022-08-03 DIAGNOSIS — M48.061 SPINAL STENOSIS, LUMBAR REGION WITHOUT NEUROGENIC CLAUDICATION: ICD-10-CM

## 2022-08-03 DIAGNOSIS — M43.16 SPONDYLOLISTHESIS, LUMBAR REGION: ICD-10-CM

## 2022-08-03 DIAGNOSIS — Z98.89 OTHER SPECIFIED POSTPROCEDURAL STATES: Chronic | ICD-10-CM

## 2022-08-03 PROCEDURE — 72128 CT CHEST SPINE W/O DYE: CPT | Mod: 26,ME

## 2022-08-03 PROCEDURE — 72131 CT LUMBAR SPINE W/O DYE: CPT | Mod: ME

## 2022-08-03 PROCEDURE — G1004: CPT

## 2022-08-03 PROCEDURE — 72128 CT CHEST SPINE W/O DYE: CPT | Mod: ME

## 2022-08-03 PROCEDURE — 72131 CT LUMBAR SPINE W/O DYE: CPT | Mod: 26,ME

## 2022-08-09 ENCOUNTER — APPOINTMENT (OUTPATIENT)
Dept: NEUROSURGERY | Facility: CLINIC | Age: 77
End: 2022-08-09

## 2022-08-09 VITALS
TEMPERATURE: 98 F | OXYGEN SATURATION: 98 % | BODY MASS INDEX: 24.8 KG/M2 | HEIGHT: 63 IN | HEART RATE: 105 BPM | SYSTOLIC BLOOD PRESSURE: 170 MMHG | WEIGHT: 140 LBS | DIASTOLIC BLOOD PRESSURE: 85 MMHG

## 2022-08-09 PROCEDURE — 99214 OFFICE O/P EST MOD 30 MIN: CPT | Mod: 57

## 2022-08-09 NOTE — REASON FOR VISIT
[New Patient Visit] : a new patient visit [Referred By: _________] : Patient was referred by ELA [Spouse] : spouse [Follow-Up: _____] : a [unfilled] follow-up visit

## 2022-08-15 ENCOUNTER — OUTPATIENT (OUTPATIENT)
Dept: OUTPATIENT SERVICES | Facility: HOSPITAL | Age: 77
LOS: 1 days | End: 2022-08-15
Payer: MEDICARE

## 2022-08-15 VITALS
SYSTOLIC BLOOD PRESSURE: 162 MMHG | HEIGHT: 63 IN | TEMPERATURE: 98 F | WEIGHT: 143.96 LBS | DIASTOLIC BLOOD PRESSURE: 90 MMHG | HEART RATE: 94 BPM | RESPIRATION RATE: 18 BRPM | OXYGEN SATURATION: 98 %

## 2022-08-15 DIAGNOSIS — M48.061 SPINAL STENOSIS, LUMBAR REGION WITHOUT NEUROGENIC CLAUDICATION: ICD-10-CM

## 2022-08-15 DIAGNOSIS — Z98.89 OTHER SPECIFIED POSTPROCEDURAL STATES: Chronic | ICD-10-CM

## 2022-08-15 DIAGNOSIS — Z98.890 OTHER SPECIFIED POSTPROCEDURAL STATES: Chronic | ICD-10-CM

## 2022-08-15 DIAGNOSIS — Z29.9 ENCOUNTER FOR PROPHYLACTIC MEASURES, UNSPECIFIED: ICD-10-CM

## 2022-08-15 DIAGNOSIS — Z01.818 ENCOUNTER FOR OTHER PREPROCEDURAL EXAMINATION: ICD-10-CM

## 2022-08-15 LAB
ANION GAP SERPL CALC-SCNC: 13 MMOL/L — SIGNIFICANT CHANGE UP (ref 5–17)
BLD GP AB SCN SERPL QL: NEGATIVE — SIGNIFICANT CHANGE UP
BUN SERPL-MCNC: 15 MG/DL — SIGNIFICANT CHANGE UP (ref 7–23)
CALCIUM SERPL-MCNC: 10 MG/DL — SIGNIFICANT CHANGE UP (ref 8.4–10.5)
CHLORIDE SERPL-SCNC: 101 MMOL/L — SIGNIFICANT CHANGE UP (ref 96–108)
CO2 SERPL-SCNC: 26 MMOL/L — SIGNIFICANT CHANGE UP (ref 22–31)
CREAT SERPL-MCNC: 0.62 MG/DL — SIGNIFICANT CHANGE UP (ref 0.5–1.3)
EGFR: 92 ML/MIN/1.73M2 — SIGNIFICANT CHANGE UP
GLUCOSE SERPL-MCNC: 96 MG/DL — SIGNIFICANT CHANGE UP (ref 70–99)
HCT VFR BLD CALC: 46.6 % — HIGH (ref 34.5–45)
HGB BLD-MCNC: 14.8 G/DL — SIGNIFICANT CHANGE UP (ref 11.5–15.5)
MCHC RBC-ENTMCNC: 27.6 PG — SIGNIFICANT CHANGE UP (ref 27–34)
MCHC RBC-ENTMCNC: 31.8 GM/DL — LOW (ref 32–36)
MCV RBC AUTO: 86.8 FL — SIGNIFICANT CHANGE UP (ref 80–100)
MRSA PCR RESULT.: SIGNIFICANT CHANGE UP
NRBC # BLD: 0 /100 WBCS — SIGNIFICANT CHANGE UP (ref 0–0)
PLATELET # BLD AUTO: 305 K/UL — SIGNIFICANT CHANGE UP (ref 150–400)
POTASSIUM SERPL-MCNC: 4 MMOL/L — SIGNIFICANT CHANGE UP (ref 3.5–5.3)
POTASSIUM SERPL-SCNC: 4 MMOL/L — SIGNIFICANT CHANGE UP (ref 3.5–5.3)
RBC # BLD: 5.37 M/UL — HIGH (ref 3.8–5.2)
RBC # FLD: 13.2 % — SIGNIFICANT CHANGE UP (ref 10.3–14.5)
RH IG SCN BLD-IMP: POSITIVE — SIGNIFICANT CHANGE UP
S AUREUS DNA NOSE QL NAA+PROBE: DETECTED
SODIUM SERPL-SCNC: 140 MMOL/L — SIGNIFICANT CHANGE UP (ref 135–145)
WBC # BLD: 8.52 K/UL — SIGNIFICANT CHANGE UP (ref 3.8–10.5)
WBC # FLD AUTO: 8.52 K/UL — SIGNIFICANT CHANGE UP (ref 3.8–10.5)

## 2022-08-15 PROCEDURE — 86901 BLOOD TYPING SEROLOGIC RH(D): CPT

## 2022-08-15 PROCEDURE — 87641 MR-STAPH DNA AMP PROBE: CPT

## 2022-08-15 PROCEDURE — 86850 RBC ANTIBODY SCREEN: CPT

## 2022-08-15 PROCEDURE — 85027 COMPLETE CBC AUTOMATED: CPT

## 2022-08-15 PROCEDURE — G0463: CPT

## 2022-08-15 PROCEDURE — 80048 BASIC METABOLIC PNL TOTAL CA: CPT

## 2022-08-15 PROCEDURE — 87640 STAPH A DNA AMP PROBE: CPT

## 2022-08-15 PROCEDURE — 86900 BLOOD TYPING SEROLOGIC ABO: CPT

## 2022-08-15 RX ORDER — CEFAZOLIN SODIUM 1 G
2000 VIAL (EA) INJECTION ONCE
Refills: 0 | Status: DISCONTINUED | OUTPATIENT
Start: 2022-08-22 | End: 2022-08-22

## 2022-08-15 RX ORDER — LIDOCAINE HCL 20 MG/ML
0.2 VIAL (ML) INJECTION ONCE
Refills: 0 | Status: DISCONTINUED | OUTPATIENT
Start: 2022-08-22 | End: 2022-08-22

## 2022-08-15 RX ORDER — SODIUM CHLORIDE 9 MG/ML
3 INJECTION INTRAMUSCULAR; INTRAVENOUS; SUBCUTANEOUS EVERY 8 HOURS
Refills: 0 | Status: DISCONTINUED | OUTPATIENT
Start: 2022-08-22 | End: 2022-08-22

## 2022-08-15 RX ORDER — OMEPRAZOLE 10 MG/1
1 CAPSULE, DELAYED RELEASE ORAL
Qty: 0 | Refills: 0 | DISCHARGE

## 2022-08-15 RX ORDER — DILTIAZEM HCL 120 MG
3 CAPSULE, EXT RELEASE 24 HR ORAL
Qty: 0 | Refills: 0 | DISCHARGE

## 2022-08-15 RX ORDER — CHLORHEXIDINE GLUCONATE 213 G/1000ML
1 SOLUTION TOPICAL ONCE
Refills: 0 | Status: DISCONTINUED | OUTPATIENT
Start: 2022-08-22 | End: 2022-08-22

## 2022-08-15 NOTE — H&P PST ADULT - NSICDXPASTSURGICALHX_GEN_ALL_CORE_FT
Subjective:      Review of Systems   Constitutional: Negative for activity change, appetite change, chills, fatigue and fever. HENT: Positive for dental problem (back bottom right impacted wisdom tooth, not hurting her right now). Negative for congestion, ear pain, rhinorrhea and sinus pressure. Eyes: Negative for discharge and visual disturbance. Respiratory: Negative for cough, chest tightness and shortness of breath. Cardiovascular: Negative for chest pain, palpitations and leg swelling. Gastrointestinal: Negative for abdominal pain, blood in stool, constipation and diarrhea. Endocrine: Negative for cold intolerance and heat intolerance. Genitourinary: Negative for difficulty urinating and hematuria. Musculoskeletal: Positive for arthralgias, back pain and neck pain. Negative for myalgias. Skin: Negative for rash. Neurological: Negative for dizziness, light-headedness and headaches. Psychiatric/Behavioral: Positive for sleep disturbance. Negative for dysphoric mood and self-injury. Objective:     /65 (Site: Left Arm, Position: Sitting, Cuff Size: Large Adult)   Pulse 68   Ht 4' 7\" (1.397 m)   Wt 117 lb 3.2 oz (53.2 kg)   BMI 27.24 kg/m²      Wt Readings from Last 3 Encounters:   10/11/17 117 lb 3.2 oz (53.2 kg)   06/30/17 115 lb 9.6 oz (52.4 kg)   06/05/17 114 lb (51.7 kg)       Physical Exam   Constitutional: She is oriented to person, place, and time. She appears well-developed and well-nourished. No distress. HENT:   Head: Normocephalic and atraumatic. Right Ear: External ear normal.   Left Ear: External ear normal.   Nose: Nose normal.   Mouth/Throat: Oropharynx is clear and moist.   Eyes: Conjunctivae and EOM are normal. Pupils are equal, round, and reactive to light. Neck: Trachea normal, normal range of motion and full passive range of motion without pain. No thyroid mass present.    Cardiovascular: Normal rate, regular rhythm, S1 normal, S2 normal and normal heart sounds. Exam reveals no distant heart sounds and no friction rub. Pulmonary/Chest: Effort normal and breath sounds normal. No accessory muscle usage. No respiratory distress. Abdominal: Soft. Bowel sounds are normal. She exhibits no distension, no ascites and no mass. Musculoskeletal: Normal range of motion. Cervical back: She exhibits tenderness. Thoracic back: She exhibits tenderness. Lumbar back: She exhibits tenderness. Pain free ROM     Lymphadenopathy:     She has no cervical adenopathy. Neurological: She is oriented to person, place, and time. She has normal reflexes. Gait is normal.   Skin: Skin is warm and dry. No rash noted. She is not diaphoretic. Psychiatric: She has a normal mood and affect. Her behavior is normal. Judgment and thought content normal.     Assessment:       1. Chronic bilateral low back pain without sciatica  cyclobenzaprine (FLEXERIL) 10 MG tablet   2. Primary insomnia  traZODone (DESYREL) 50 MG tablet   3. Hematuria, microscopic  POCT Urinalysis No Micro (Auto)   4. Anxiety  buPROPion (WELLBUTRIN SR) 100 MG extended release tablet       Plan:      1. Chronic bilateral low back pain without sciatica    - cyclobenzaprine (FLEXERIL) 10 MG tablet; TAKE ONE TABLET BY MOUTH ONCE DAILY  Dispense: 30 tablet; Refill: 2    2. Primary insomnia  Will try trazadone  - traZODone (DESYREL) 50 MG tablet; Take 1 tablet by mouth nightly as needed for Sleep  Dispense: 30 tablet; Refill: 1    3. Hematuria, microscopic  Feel that this is related to her blood thinner, she is going to make an appt with cardio to see when   - POCT Urinalysis No Micro (Auto)  Will order ct abd pelvis for eval    4. Anxiety    - buPROPion (WELLBUTRIN SR) 100 MG extended release tablet; Take 1 tablet by mouth 2 times daily  Dispense: 60 tablet;  Refill: 3    Refuses flu shot    RTO if symptoms worsen or fail to improve  Pt agreeable with plan      Patient given educational materials - see patient instructions. Discussed use, benefit, and side effects of prescribed medications. All patient questions answered. Pt voiced understanding. Reviewed health maintenance. Instructed to continue current medications, diet and exercise. 1.  Santo Mejia received counseling on the following healthy behaviors: nutrition, exercise and medication adherence  2. Patient given educational materials when available - see patient instructions when applicable  3. Discussed use, benefit, and side effects of prescribed medications. Barriers to medication compliance addressed. All patient questions answered. Pt voiced understanding. 4.  Reviewed prior labs and health maintenance  5. Continue current medications, diet and exercise.     Completed Refills   Requested Prescriptions     Signed Prescriptions Disp Refills    cyclobenzaprine (FLEXERIL) 10 MG tablet 30 tablet 2     Sig: TAKE ONE TABLET BY MOUTH ONCE DAILY    traZODone (DESYREL) 50 MG tablet 30 tablet 1     Sig: Take 1 tablet by mouth nightly as needed for Sleep    buPROPion (WELLBUTRIN SR) 100 MG extended release tablet 60 tablet 3     Sig: Take 1 tablet by mouth 2 times daily         Electronically signed by Julio Cool CNP on 10/11/2017 at 7:12 PM PAST SURGICAL HISTORY:  History of colonoscopy     History of lumbar surgery fusion L4-5    S/P ORIF (open reduction internal fixation) fracture fractured femur (sx twice)

## 2022-08-15 NOTE — H&P PST ADULT - FUNCTIONAL ASSESSMENT - BASIC MOBILITY ASSESSMENT TYPE
Patient c/o vaginal bleeding that started today. Patient is 8 weeks pregnant. C/O lower abdominal and pelvic pain. Denies urinary frequency, urgency or pain with urination. G1, P0  
Admission

## 2022-08-15 NOTE — H&P PST ADULT - PROBLEM SELECTOR PLAN 1
Scheduled for sx on 8/22/22. Surgical and chlorhexidine instructions reviewed w/ pt and spouse. COVID testing scheduled on 8/19/2022. Cards/PCP seen a week ago, scheduled Echo on 8/19/2022.

## 2022-08-15 NOTE — H&P PST ADULT - NSICDXPASTMEDICALHX_GEN_ALL_CORE_FT
PAST MEDICAL HISTORY:  GERD (gastroesophageal reflux disease)     [place] (hard of hearing)     Hyperlipidemia     Hypertension     Spinal stenosis, lumbar region without neurogenic claudication

## 2022-08-15 NOTE — H&P PST ADULT - ASSESSMENT
CAPRINI SCORE [CLOT updated 18]    AGE RELATED RISK FACTORS                                                       MOBILITY RELATED FACTORS  [ ] Age 41-60 years                                            (1 Point)                    [ ] Bed rest                                                        (1 Point)  [ ] Age: 61-74 years                                           (2 Points)                  [ ] Plaster cast                                                   (2 Points)  [ ] Age= 75 years                                              (3 Points)                    [ ] Bed bound for more than 72 hours                 (2 Points)    DISEASE RELATED RISK FACTORS                                               GENDER SPECIFIC FACTORS  [ ] Edema in the lower extremities                       (1 Point)              [ ] Pregnancy                                                     (1 Point)  [ ] Varicose veins                                               (1 Point)                     [ ] Post-partum < 6 weeks                                   (1 Point)             [ ] BMI > 25 Kg/m2                                            (1 Point)                     [ ] Hormonal therapy  or oral contraception          (1 Point)                 [ ] Sepsis (in the previous month)                        (1 Point)               [ ] History of pregnancy complications                 (1 point)  [ ] Pneumonia or serious lung disease                                               [ ] Unexplained or recurrent                     (1 Point)           (in the previous month)                               (1 Point)  [ ] Abnormal pulmonary function test                     (1 Point)                 SURGERY RELATED RISK FACTORS  [ ] Acute myocardial infarction                              (1 Point)               [ ]  Section                                             (1 Point)  [ ] Congestive heart failure (in the previous month)  (1 Point)      [ ] Minor surgery                                                  (1 Point)   [ ] Inflammatory bowel disease                             (1 Point)               [ ] Arthroscopic surgery                                        (2 Points)  [ ] Central venous access                                      (2 Points)                [ ] General surgery lasting more than 45 minutes (2 points)  [ ] Present or previous malignancy                     (2 Points)                [ ] Elective arthroplasty                                         (5 points)    [ ] Stroke (in the previous month)                          (5 Points)                                                                                                                                                           HEMATOLOGY RELATED FACTORS                                                 TRAUMA RELATED RISK FACTORS  [ ] Prior episodes of VTE                                     (3 Points)                [ ] Fracture of the hip, pelvis, or leg                       (5 Points)  [ ] Positive family history for VTE                         (3 Points)             [ ] Acute spinal cord injury (in the previous month)  (5 Points)  [ ] Prothrombin 65569 A                                     (3 Points)               [ ] Paralysis  (less than 1 month)                             (5 Points)  [ ] Factor V Leiden                                             (3 Points)                  [ ] Multiple Trauma within 1 month                        (5 Points)  [ ] Lupus anticoagulants                                     (3 Points)                                                           [ ] Anticardiolipin antibodies                               (3 Points)                                                       [ ] High homocysteine in the blood                      (3 Points)                                             [ ] Other congenital or acquired thrombophilia      (3 Points)                                                [ ] Heparin induced thrombocytopenia                  (3 Points)                                     Total Score [ 6 ]

## 2022-08-15 NOTE — H&P PST ADULT - GENITOURINARY
Patient Education     Self-Care for Sore Throats    Sore throats happen for many reasons, such as colds, allergies, and infections caused by viruses or bacteria. In any case, your throat becomes red and sore. Your goal for self-care is to reduce your discomfort while giving your throat a chance to heal.  Moisten and soothe your throat  Tips include the following:    Try a sip of water first thing after waking up.    Keep your throat moist by drinking 6 or more glasses of clear liquids every day.    Run a cool-air humidifier in your room overnight.    Avoid cigarette smoke.     Suck on throat lozenges, cough drops, hard candy, ice chips, or frozen fruit-juice bars. Use the sugar-free versions if your diet or medical condition requires them.  Gargle to ease irritation  Gargling every hour or 2 can ease irritation. Try gargling with 1 of these solutions:    1/4 teaspoon of salt in 1/2 cup of warm water    An over-the-counter anesthetic gargle  Use medicine for more relief  Over-the-counter medicine can reduce sore throat symptoms. Ask your pharmacist if you have questions about which medicine to use:    Ease pain with anesthetic sprays. Aspirin or an aspirin substitute also helps. Remember, never give aspirin to anyone 18 or younger, or if you are already taking blood thinners.     For sore throats caused by allergies, try antihistamines to block the allergic reaction.    Remember: unless a sore throat is caused by a bacterial infection, antibiotics won t help you.  Prevent future sore throats  Prevention tips include the following:    Stop smoking or reduce contact with secondhand smoke. Smoke irritates the tender throat lining.    Limit contact with pets and with allergy-causing substances, such as pollen and mold.    When you re around someone with a sore throat or cold, wash your hands often to keep viruses or bacteria from spreading.    Don t strain your vocal cords.  Call your healthcare provider  Contact your  healthcare provider if you have:    A temperature over 101 F (38.3 C)    White spots on the throat    Great difficulty swallowing    Trouble breathing    A skin rash    Recent exposure to someone else with strep bacteria    Severe hoarseness and swollen glands in the neck or jaw   Date Last Reviewed: 8/1/2016 2000-2018 [employer]. [address] [place], [place] [id number]. All rights reserved. This information is not intended as a substitute for professional medical care. Always follow your healthcare professional's instructions.            negative

## 2022-08-15 NOTE — H&P PST ADULT - HISTORY OF PRESENT ILLNESS
76 yo female with hx of htn, anxiety disorder who presented to er with c/o fever ,chills, rash starting 4 am .  pt seen in urgent care with positive u/a and fever of 102 and tachycardic.  no chest pain,sob,dizziness.         78 yo female presents to PST ambulating with cane w/ c/o of worsening back pain and is now scheduled for a Spinal Stimulator Trial on 8/22/2022. PMH of HTN, HLD, GERD and [place]. Denies recent fevers, chills, cough, chest pain or SOB. COVID testing scheduled on 8/19/2022.          78 yo female presents to PST ambulating with cane w/ long standing lumbar stenosis and failed lumbar fusion with chronic severe back pain and lower extremity pain, parasthenia and weakness and is now scheduled for a Spinal Stimulator Trial on 8/22/2022. PMH of HTN, HLD, GERD and [place]. Denies recent fevers, chills, cough, chest pain or SOB. COVID testing scheduled on 8/19/2022.

## 2022-08-19 ENCOUNTER — OUTPATIENT (OUTPATIENT)
Dept: OUTPATIENT SERVICES | Facility: HOSPITAL | Age: 77
LOS: 1 days | End: 2022-08-19

## 2022-08-19 DIAGNOSIS — Z98.89 OTHER SPECIFIED POSTPROCEDURAL STATES: Chronic | ICD-10-CM

## 2022-08-19 DIAGNOSIS — Z98.890 OTHER SPECIFIED POSTPROCEDURAL STATES: Chronic | ICD-10-CM

## 2022-08-19 DIAGNOSIS — Z11.52 ENCOUNTER FOR SCREENING FOR COVID-19: ICD-10-CM

## 2022-08-19 LAB — SARS-COV-2 RNA SPEC QL NAA+PROBE: SIGNIFICANT CHANGE UP

## 2022-08-21 ENCOUNTER — TRANSCRIPTION ENCOUNTER (OUTPATIENT)
Age: 77
End: 2022-08-21

## 2022-08-22 ENCOUNTER — APPOINTMENT (OUTPATIENT)
Dept: NEUROSURGERY | Facility: HOSPITAL | Age: 77
End: 2022-08-22

## 2022-08-22 ENCOUNTER — TRANSCRIPTION ENCOUNTER (OUTPATIENT)
Age: 77
End: 2022-08-22

## 2022-08-22 ENCOUNTER — OUTPATIENT (OUTPATIENT)
Dept: INPATIENT UNIT | Facility: HOSPITAL | Age: 77
LOS: 1 days | End: 2022-08-22
Payer: MEDICARE

## 2022-08-22 VITALS
HEART RATE: 73 BPM | SYSTOLIC BLOOD PRESSURE: 142 MMHG | RESPIRATION RATE: 126 BRPM | OXYGEN SATURATION: 94 % | DIASTOLIC BLOOD PRESSURE: 75 MMHG | TEMPERATURE: 97 F

## 2022-08-22 VITALS
RESPIRATION RATE: 18 BRPM | TEMPERATURE: 99 F | SYSTOLIC BLOOD PRESSURE: 169 MMHG | OXYGEN SATURATION: 96 % | HEIGHT: 63 IN | DIASTOLIC BLOOD PRESSURE: 105 MMHG | HEART RATE: 84 BPM | WEIGHT: 143.96 LBS

## 2022-08-22 DIAGNOSIS — Z98.89 OTHER SPECIFIED POSTPROCEDURAL STATES: Chronic | ICD-10-CM

## 2022-08-22 DIAGNOSIS — Z98.890 OTHER SPECIFIED POSTPROCEDURAL STATES: Chronic | ICD-10-CM

## 2022-08-22 DIAGNOSIS — M48.061 SPINAL STENOSIS, LUMBAR REGION WITHOUT NEUROGENIC CLAUDICATION: ICD-10-CM

## 2022-08-22 PROBLEM — K21.9 GASTRO-ESOPHAGEAL REFLUX DISEASE WITHOUT ESOPHAGITIS: Chronic | Status: ACTIVE | Noted: 2022-08-15

## 2022-08-22 PROBLEM — E78.5 HYPERLIPIDEMIA, UNSPECIFIED: Chronic | Status: ACTIVE | Noted: 2022-08-15

## 2022-08-22 PROBLEM — H91.90 UNSPECIFIED HEARING LOSS, UNSPECIFIED EAR: Chronic | Status: ACTIVE | Noted: 2022-08-15

## 2022-08-22 LAB — RH IG SCN BLD-IMP: POSITIVE — SIGNIFICANT CHANGE UP

## 2022-08-22 PROCEDURE — U0005: CPT

## 2022-08-22 PROCEDURE — 76000 FLUOROSCOPY <1 HR PHYS/QHP: CPT

## 2022-08-22 PROCEDURE — 71045 X-RAY EXAM CHEST 1 VIEW: CPT | Mod: 26

## 2022-08-22 PROCEDURE — 63650 IMPLANT NEUROELECTRODES: CPT

## 2022-08-22 PROCEDURE — C9399: CPT

## 2022-08-22 PROCEDURE — C9803: CPT

## 2022-08-22 PROCEDURE — 71045 X-RAY EXAM CHEST 1 VIEW: CPT

## 2022-08-22 PROCEDURE — U0003: CPT

## 2022-08-22 PROCEDURE — C1778: CPT

## 2022-08-22 DEVICE — LEAD VECTRIS SURESCAN 1X8: Type: IMPLANTABLE DEVICE | Status: FUNCTIONAL

## 2022-08-22 RX ORDER — SODIUM CHLORIDE 9 MG/ML
1000 INJECTION, SOLUTION INTRAVENOUS
Refills: 0 | Status: DISCONTINUED | OUTPATIENT
Start: 2022-08-22 | End: 2022-08-22

## 2022-08-22 RX ORDER — CEPHALEXIN 500 MG
1 CAPSULE ORAL
Qty: 20 | Refills: 0
Start: 2022-08-22 | End: 2022-08-31

## 2022-08-22 RX ORDER — HYDROMORPHONE HYDROCHLORIDE 2 MG/ML
0.5 INJECTION INTRAMUSCULAR; INTRAVENOUS; SUBCUTANEOUS
Refills: 0 | Status: DISCONTINUED | OUTPATIENT
Start: 2022-08-22 | End: 2022-08-22

## 2022-08-22 NOTE — PATIENT PROFILE ADULT - FALL HARM RISK - HARM RISK INTERVENTIONS

## 2022-08-22 NOTE — ASU DISCHARGE PLAN (ADULT/PEDIATRIC) - NS MD DC FALL RISK RISK
For information on Fall & Injury Prevention, visit: [email or web address] OR  [email or web address] OR  https://www.cdc.gov/steadi/patient.html

## 2022-08-22 NOTE — ASU DISCHARGE PLAN (ADULT/PEDIATRIC) - ASU DC SPECIAL INSTRUCTIONSFT
Patient underwhen perc placement of SCS trial leads with external generator. Will be programmed in recovery. Outpatient follow up w/ Dr. Joshi in 5-7d for lead removal. DO NOT SHOWER OR REMOVE DRESSINGS. Postop antibiotics as ordered for duration of trial sent to pharmacy. Call Dr. Joshi's office to make appointment for lead removal and follow further instructions per [employer] rep.

## 2022-08-22 NOTE — ASU DISCHARGE PLAN (ADULT/PEDIATRIC) - CARE PROVIDER_API CALL
Sam Joshi (MD)  Neurosurgery  [address], [address]  [place] [address]  Phone: [phone]  Fax: [phone]  Follow Up Time:

## 2022-08-23 ENCOUNTER — APPOINTMENT (OUTPATIENT)
Dept: NEUROSURGERY | Facility: CLINIC | Age: 77
End: 2022-08-23

## 2022-08-23 PROCEDURE — 99024 POSTOP FOLLOW-UP VISIT: CPT

## 2022-08-23 PROCEDURE — 95972 ALYS CPLX SP/PN NPGT W/PRGRM: CPT

## 2022-08-23 NOTE — PHYSICAL EXAM
[General Appearance - Alert] : alert [General Appearance - In No Acute Distress] : in no acute distress [General Appearance - Well Nourished] : well nourished [General Appearance - Well Developed] : well developed [FreeTextEntry1] : blood underneath Tegaderm noted. No active bleeding [Oriented To Time, Place, And Person] : oriented to person, place, and time [Impaired Insight] : insight and judgment were intact [Motor Tone] : muscle tone was normal in all four extremities [Motor Strength] : muscle strength was normal in all four extremities [No Muscle Atrophy] : normal bulk in all four extremities [Nail Clubbing] : no clubbing  or cyanosis of the fingernails [Involuntary Movements] : no involuntary movements were seen

## 2022-08-23 NOTE — HISTORY OF PRESENT ILLNESS
[FreeTextEntry1] : The patient reports that she has been suffering from low back pain that radiates into her left leg and foot for multiple years. She had undergone L4-5 laminectomy and fusion in 2018. She reports that having any improvement in her back pain and started to have foot drop and increased pain. She is ambulating with a walker. She has tried medication, epidural injections, physical therapy but none is effective. \par \par She reports the worst pain is in her right big toe especially at night, tingling, cramping and burning sensation in her legs\par \par MRI lumbar spine done on 8/2/2021 showed multilevel severe degenerative disc disease and central canal stenosis with spondylolisthesis and scoliosis. She has been following up with Dr. Meneses who then advised her to come here for an evaluation for spinal cord stimulator trial/ placement. \par \par She underwent SCS trial yesterday. She reports that it helped with pain in her low back and legs significantly. However, she started to have tingling and numbness in her legs and feet. She turned off the stimulator and pain in her back and legs returned and tingling and numbness in her legs and feet is less. Her  also reports blood underneath the Tegaderm.

## 2022-08-23 NOTE — ASSESSMENT
[FreeTextEntry1] : 77 years old woman with long standing lumbar stenosis and failed lumbar fusion with chronic severe back pain and lower extremity pain, parasthenia and weakness. She has had failed conservative pain management including physical therapy, injections and pain medications. Pain has become constant and is interfering with her quality of life. \par \par She underwent She underwent SCS trial yesterday. She reports that it helped with pain in her low back and legs significantly. However, she started to have tingling and numbness in her legs and feet. She turned off the stimulator and pain in her back and legs returned and tingling and numbness in her legs and feet is less. Her  also reports blood underneath the Tegaderm. Wound examined. No active bleeding noted. SCS interrogated and readjusted with help from Danielle and Tito from [employer].\par \par Plan:\par - Follow up next week on 8/30/22 for lead removal\par

## 2022-08-23 NOTE — REASON FOR VISIT
[de-identified] : [facility] trial [de-identified] : 8/22/22 [Spouse] : spouse

## 2022-08-26 ENCOUNTER — APPOINTMENT (OUTPATIENT)
Dept: RADIOLOGY | Facility: IMAGING CENTER | Age: 77
End: 2022-08-26

## 2022-08-26 ENCOUNTER — OUTPATIENT (OUTPATIENT)
Dept: OUTPATIENT SERVICES | Facility: HOSPITAL | Age: 77
LOS: 1 days | End: 2022-08-26
Payer: MEDICARE

## 2022-08-26 ENCOUNTER — APPOINTMENT (OUTPATIENT)
Dept: NEUROSURGERY | Facility: CLINIC | Age: 77
End: 2022-08-26

## 2022-08-26 DIAGNOSIS — Z98.890 OTHER SPECIFIED POSTPROCEDURAL STATES: Chronic | ICD-10-CM

## 2022-08-26 DIAGNOSIS — M48.061 SPINAL STENOSIS, LUMBAR REGION WITHOUT NEUROGENIC CLAUDICATION: ICD-10-CM

## 2022-08-26 DIAGNOSIS — Z98.89 OTHER SPECIFIED POSTPROCEDURAL STATES: Chronic | ICD-10-CM

## 2022-08-26 DIAGNOSIS — Z96.89 PRESENCE OF OTHER SPECIFIED FUNCTIONAL IMPLANTS: ICD-10-CM

## 2022-08-26 PROCEDURE — 72082 X-RAY EXAM ENTIRE SPI 2/3 VW: CPT

## 2022-08-26 PROCEDURE — 99024 POSTOP FOLLOW-UP VISIT: CPT

## 2022-08-26 PROCEDURE — 72082 X-RAY EXAM ENTIRE SPI 2/3 VW: CPT | Mod: 26

## 2022-08-29 NOTE — ASSESSMENT
[FreeTextEntry1] : 77 years old woman with long standing lumbar stenosis and failed lumbar fusion with chronic severe back pain and lower extremity pain, parasthenia and weakness. She has had failed conservative pain management including physical therapy, injections and pain medications. Pain has become constant and is interfering with her quality of life. \par \par She underwent a trial of spinal cord stimulator and had 80-90 % reduction in her pain level at her low back and lower extremities. She is a candidate for permanent SCS.\par \par Risks and benefits discussed and she wishes to proceed.\par \par \par

## 2022-08-29 NOTE — HISTORY OF PRESENT ILLNESS
[FreeTextEntry1] : The patient reports that she has been suffering from low back pain that radiates into her left leg and foot for multiple years. She had undergone L4-5 laminectomy and fusion in 2018. She reports that having any improvement in her back pain and started to have foot drop and increased pain. She is ambulating with a walker. She has tried medication, epidural injections, physical therapy but none is effective. \par \par She reports the worst pain is in her right big toe especially at night, tingling, cramping and burning sensation in her legs\par \par MRI lumbar spine done on 8/2/2021 showed multilevel severe degenerative disc disease and central canal stenosis with spondylolisthesis and scoliosis. She has been following up with Dr. Meneses who then advised her to come here for an evaluation for spinal cord stimulator trial/ placement. \par \par She underwent SCS trial yesterday. She reports that it helped with pain in her low back and legs significantly. However, she started to have tingling and numbness in her legs and feet. She turned off the stimulator and pain in her back and legs returned and tingling and numbness in her legs and feet is less.\par \par She reports 80-90% pain relief in the first 2 days. However, starting yesterday pain started to return. Xray thoracic spine done today showed leads migrated down. Decision was made to pull out the SCS and move forward with a permanent implant

## 2022-08-29 NOTE — PHYSICAL EXAM
[General Appearance - Alert] : alert [General Appearance - In No Acute Distress] : in no acute distress [General Appearance - Well Nourished] : well nourished [General Appearance - Well Developed] : well developed [Intact] : intact [No Drainage] : without drainage [Motor Tone] : muscle tone was normal in all four extremities [Motor Strength] : muscle strength was normal in all four extremities [Erythema] : not erythematous [Tender] : not tender [Warm] : not warm [Indurated] : not indurated

## 2022-08-30 ENCOUNTER — APPOINTMENT (OUTPATIENT)
Dept: NEUROSURGERY | Facility: CLINIC | Age: 77
End: 2022-08-30

## 2022-08-30 NOTE — ASSESSMENT
[FreeTextEntry1] : 77 years old woman with long standing lumbar stenosis and failed lumbar fusion with chronic severe back pain and lower extremity pain, parasthenia and weakness. She has had failed conservative pain management including physical therapy, injections and pain medications. Pain has become constant and is interfering with her quality of life. She is a good candidate for a spinal cord stimulator trial.\par \par Plan:\par - Medical clearance\par - SCS trial with [employer]. Risks and benefits discussed and she wishes to proceed.\par \par

## 2022-08-30 NOTE — HISTORY OF PRESENT ILLNESS
[FreeTextEntry1] : The patient reports that she has been suffering from low back pain that radiates into her left leg and foot for multiple years. She had undergone L4-5 laminectomy and fusion in 2018. She reports that having any improvement in her back pain and started to have foot drop and increased pain. She is ambulating with a walker. She has tried medication, epidural injections, physical therapy but none is effective. \par \par She reports the worst pain is in her right big toe especially at night, tingling, cramping and burning sensation in her legs\par \par MRI lumbar spine done on 8/2/2021 showed multilevel severe degenerative disc disease and central canal stenosis with spondylolisthesis and scoliosis. She has been following up with Dr. Meneses who then advised her to come here for an evaluation for spinal cord stimulator trial/ placement. \par \par She was last seen on 5/17/22 and found to be a good candidate for SCS trial. She obtained psychological clearance for Dr. Everett. She has further question about SCS.

## 2022-09-09 ENCOUNTER — OUTPATIENT (OUTPATIENT)
Dept: OUTPATIENT SERVICES | Facility: HOSPITAL | Age: 77
LOS: 1 days | End: 2022-09-09
Payer: MEDICARE

## 2022-09-09 VITALS
RESPIRATION RATE: 16 BRPM | HEART RATE: 88 BPM | OXYGEN SATURATION: 97 % | TEMPERATURE: 98 F | HEIGHT: 63 IN | DIASTOLIC BLOOD PRESSURE: 90 MMHG | WEIGHT: 139.99 LBS | SYSTOLIC BLOOD PRESSURE: 151 MMHG

## 2022-09-09 DIAGNOSIS — M48.061 SPINAL STENOSIS, LUMBAR REGION WITHOUT NEUROGENIC CLAUDICATION: ICD-10-CM

## 2022-09-09 DIAGNOSIS — Z01.818 ENCOUNTER FOR OTHER PREPROCEDURAL EXAMINATION: ICD-10-CM

## 2022-09-09 DIAGNOSIS — Z98.89 OTHER SPECIFIED POSTPROCEDURAL STATES: Chronic | ICD-10-CM

## 2022-09-09 DIAGNOSIS — Z98.890 OTHER SPECIFIED POSTPROCEDURAL STATES: Chronic | ICD-10-CM

## 2022-09-09 LAB
ANION GAP SERPL CALC-SCNC: 13 MMOL/L — SIGNIFICANT CHANGE UP (ref 5–17)
BLD GP AB SCN SERPL QL: NEGATIVE — SIGNIFICANT CHANGE UP
BUN SERPL-MCNC: 21 MG/DL — SIGNIFICANT CHANGE UP (ref 7–23)
CALCIUM SERPL-MCNC: 10.2 MG/DL — SIGNIFICANT CHANGE UP (ref 8.4–10.5)
CHLORIDE SERPL-SCNC: 102 MMOL/L — SIGNIFICANT CHANGE UP (ref 96–108)
CO2 SERPL-SCNC: 23 MMOL/L — SIGNIFICANT CHANGE UP (ref 22–31)
CREAT SERPL-MCNC: 0.59 MG/DL — SIGNIFICANT CHANGE UP (ref 0.5–1.3)
EGFR: 93 ML/MIN/1.73M2 — SIGNIFICANT CHANGE UP
GLUCOSE SERPL-MCNC: 90 MG/DL — SIGNIFICANT CHANGE UP (ref 70–99)
HCT VFR BLD CALC: 43.2 % — SIGNIFICANT CHANGE UP (ref 34.5–45)
HGB BLD-MCNC: 14 G/DL — SIGNIFICANT CHANGE UP (ref 11.5–15.5)
MCHC RBC-ENTMCNC: 27.8 PG — SIGNIFICANT CHANGE UP (ref 27–34)
MCHC RBC-ENTMCNC: 32.4 GM/DL — SIGNIFICANT CHANGE UP (ref 32–36)
MCV RBC AUTO: 85.7 FL — SIGNIFICANT CHANGE UP (ref 80–100)
MRSA PCR RESULT.: SIGNIFICANT CHANGE UP
NRBC # BLD: 0 /100 WBCS — SIGNIFICANT CHANGE UP (ref 0–0)
PLATELET # BLD AUTO: 272 K/UL — SIGNIFICANT CHANGE UP (ref 150–400)
POTASSIUM SERPL-MCNC: 3.9 MMOL/L — SIGNIFICANT CHANGE UP (ref 3.5–5.3)
POTASSIUM SERPL-SCNC: 3.9 MMOL/L — SIGNIFICANT CHANGE UP (ref 3.5–5.3)
RBC # BLD: 5.04 M/UL — SIGNIFICANT CHANGE UP (ref 3.8–5.2)
RBC # FLD: 13.1 % — SIGNIFICANT CHANGE UP (ref 10.3–14.5)
RH IG SCN BLD-IMP: POSITIVE — SIGNIFICANT CHANGE UP
S AUREUS DNA NOSE QL NAA+PROBE: DETECTED
SODIUM SERPL-SCNC: 138 MMOL/L — SIGNIFICANT CHANGE UP (ref 135–145)
WBC # BLD: 6.7 K/UL — SIGNIFICANT CHANGE UP (ref 3.8–10.5)
WBC # FLD AUTO: 6.7 K/UL — SIGNIFICANT CHANGE UP (ref 3.8–10.5)

## 2022-09-09 PROCEDURE — 36415 COLL VENOUS BLD VENIPUNCTURE: CPT

## 2022-09-09 PROCEDURE — 87641 MR-STAPH DNA AMP PROBE: CPT

## 2022-09-09 PROCEDURE — 86901 BLOOD TYPING SEROLOGIC RH(D): CPT

## 2022-09-09 PROCEDURE — 86900 BLOOD TYPING SEROLOGIC ABO: CPT

## 2022-09-09 PROCEDURE — 80048 BASIC METABOLIC PNL TOTAL CA: CPT

## 2022-09-09 PROCEDURE — 86850 RBC ANTIBODY SCREEN: CPT

## 2022-09-09 PROCEDURE — 85027 COMPLETE CBC AUTOMATED: CPT

## 2022-09-09 PROCEDURE — 87640 STAPH A DNA AMP PROBE: CPT

## 2022-09-09 RX ORDER — CEFAZOLIN SODIUM 1 G
2000 VIAL (EA) INJECTION ONCE
Refills: 0 | Status: DISCONTINUED | OUTPATIENT
Start: 2022-09-19 | End: 2022-10-03

## 2022-09-09 RX ORDER — LIDOCAINE HCL 20 MG/ML
0.2 VIAL (ML) INJECTION ONCE
Refills: 0 | Status: DISCONTINUED | OUTPATIENT
Start: 2022-09-19 | End: 2022-10-03

## 2022-09-09 RX ORDER — CHLORHEXIDINE GLUCONATE 213 G/1000ML
1 SOLUTION TOPICAL ONCE
Refills: 0 | Status: DISCONTINUED | OUTPATIENT
Start: 2022-09-19 | End: 2022-10-03

## 2022-09-09 RX ORDER — SODIUM CHLORIDE 9 MG/ML
3 INJECTION INTRAMUSCULAR; INTRAVENOUS; SUBCUTANEOUS EVERY 8 HOURS
Refills: 0 | Status: DISCONTINUED | OUTPATIENT
Start: 2022-09-19 | End: 2022-10-03

## 2022-09-09 NOTE — H&P PST ADULT - ASSESSMENT
CAPRINI SCORE [CLOT updated 18]    AGE RELATED RISK FACTORS                                                       MOBILITY RELATED FACTORS  [ ] Age 41-60 years                                            (1 Point)                    [ ] Bed rest                                                        (1 Point)  [ ] Age: 61-74 years                                           (2 Points)                  [ ] Plaster cast                                                   (2 Points)  [ ] Age= 75 years                                              (3 Points)                    [ ] Bed bound for more than 72 hours                 (2 Points)    DISEASE RELATED RISK FACTORS                                               GENDER SPECIFIC FACTORS  [ ] Edema in the lower extremities                       (1 Point)              [ ] Pregnancy                                                     (1 Point)  [ ] Varicose veins                                               (1 Point)                     [ ] Post-partum < 6 weeks                                   (1 Point)             [ ] BMI > 25 Kg/m2                                            (1 Point)                     [ ] Hormonal therapy  or oral contraception          (1 Point)                 [ ] Sepsis (in the previous month)                        (1 Point)               [ ] History of pregnancy complications                 (1 point)  [ ] Pneumonia or serious lung disease                                               [ ] Unexplained or recurrent                     (1 Point)           (in the previous month)                               (1 Point)  [ ] Abnormal pulmonary function test                     (1 Point)                 SURGERY RELATED RISK FACTORS  [ ] Acute myocardial infarction                              (1 Point)               [ ]  Section                                             (1 Point)  [ ] Congestive heart failure (in the previous month)  (1 Point)      [ ] Minor surgery                                                  (1 Point)   [ ] Inflammatory bowel disease                             (1 Point)               [ ] Arthroscopic surgery                                        (2 Points)  [ ] Central venous access                                      (2 Points)                [ ] General surgery lasting more than 45 minutes (2 points)  [ ] Present or previous malignancy                     (2 Points)                [ ] Elective arthroplasty                                         (5 points)    [ ] Stroke (in the previous month)                          (5 Points)                                                                                                                                                           HEMATOLOGY RELATED FACTORS                                                 TRAUMA RELATED RISK FACTORS  [ ] Prior episodes of VTE                                     (3 Points)                [ ] Fracture of the hip, pelvis, or leg                       (5 Points)  [ ] Positive family history for VTE                         (3 Points)             [ ] Acute spinal cord injury (in the previous month)  (5 Points)  [ ] Prothrombin 44028 A                                     (3 Points)               [ ] Paralysis  (less than 1 month)                             (5 Points)  [ ] Factor V Leiden                                             (3 Points)                  [ ] Multiple Trauma within 1 month                        (5 Points)  [ ] Lupus anticoagulants                                     (3 Points)                                                           [ ] Anticardiolipin antibodies                               (3 Points)                                                       [ ] High homocysteine in the blood                      (3 Points)                                             [ ] Other congenital or acquired thrombophilia      (3 Points)                                                [ ] Heparin induced thrombocytopenia                  (3 Points)                                     Total Score [ 6 ]

## 2022-09-09 NOTE — H&P PST ADULT - NSICDXPASTSURGICALHX_GEN_ALL_CORE_FT
PAST SURGICAL HISTORY:  History of colonoscopy     History of lumbar surgery fusion L4-5    S/P ORIF (open reduction internal fixation) fracture fractured femur (sx twice)

## 2022-09-09 NOTE — H&P PST ADULT - HISTORY OF PRESENT ILLNESS
78 yo female presents to PST ambulating with cane w/ long standing lumbar stenosis and failed lumbar fusion with chronic severe back pain and lower extremity pain, parasthenia and weakness had  Spinal Stimulator Trial on 8/22/2022. PMH of HTN, HLD, GERD and [place].  Pt in PST today in preparation for spinal stimulator trial scheduled for 9/19/2022.   Denies recent fevers, chills, cough, chest pain or SOB.     COVID testing scheduled on 9/16//2022.

## 2022-09-09 NOTE — H&P PST ADULT - NEGATIVE GENERAL SYMPTOMS
Thank you for choosing [facility]. It was a pleasure to see you for your office visit today.     To reach our Specialty Clinic: [phone]  To reach our Imaging scheduler: [phone]      If you had any blood work, imaging or other tests:  Normal test results will be mailed to your home address in a letter  Abnormal results will be communicated to you via phone call/letter  Please allow up to 1-2 weeks for processing/interpretation of most lab work  If you have questions or concerns call our clinic at [phone]  
no fever/no chills/no sweating

## 2022-09-09 NOTE — H&P PST ADULT - NSICDXPASTMEDICALHX_GEN_ALL_CORE_FT
PAST MEDICAL HISTORY:  GERD (gastroesophageal reflux disease)     [place] (hard of hearing)     Hyperlipidemia     Hypertension     Spinal stenosis, lumbar region without neurogenic claudication

## 2022-09-09 NOTE — H&P PST ADULT - PROBLEM SELECTOR PLAN 1
Scheduled for sx on 9/19/22. Surgical and chlorhexidine instructions reviewed w/ pt and spouse. COVID testing scheduled on 9/16/2022.

## 2022-09-13 ENCOUNTER — RX RENEWAL (OUTPATIENT)
Age: 77
End: 2022-09-13

## 2022-09-13 RX ORDER — LIDOCAINE 5 G/100G
5 OINTMENT TOPICAL
Qty: 50 | Refills: 2 | Status: ACTIVE | COMMUNITY
Start: 2020-10-09 | End: 1900-01-01

## 2022-09-16 ENCOUNTER — OUTPATIENT (OUTPATIENT)
Dept: OUTPATIENT SERVICES | Facility: HOSPITAL | Age: 77
LOS: 1 days | End: 2022-09-16
Payer: MEDICARE

## 2022-09-16 DIAGNOSIS — Z98.890 OTHER SPECIFIED POSTPROCEDURAL STATES: Chronic | ICD-10-CM

## 2022-09-16 DIAGNOSIS — Z11.52 ENCOUNTER FOR SCREENING FOR COVID-19: ICD-10-CM

## 2022-09-16 DIAGNOSIS — Z98.89 OTHER SPECIFIED POSTPROCEDURAL STATES: Chronic | ICD-10-CM

## 2022-09-16 LAB — SARS-COV-2 RNA SPEC QL NAA+PROBE: SIGNIFICANT CHANGE UP

## 2022-09-19 ENCOUNTER — TRANSCRIPTION ENCOUNTER (OUTPATIENT)
Age: 77
End: 2022-09-19

## 2022-09-19 ENCOUNTER — RESULT REVIEW (OUTPATIENT)
Age: 77
End: 2022-09-19

## 2022-09-19 ENCOUNTER — APPOINTMENT (OUTPATIENT)
Dept: NEUROSURGERY | Facility: HOSPITAL | Age: 77
End: 2022-09-19

## 2022-09-19 ENCOUNTER — OUTPATIENT (OUTPATIENT)
Dept: INPATIENT UNIT | Facility: HOSPITAL | Age: 77
LOS: 1 days | End: 2022-09-19
Payer: MEDICARE

## 2022-09-19 VITALS
TEMPERATURE: 99 F | RESPIRATION RATE: 16 BRPM | OXYGEN SATURATION: 96 % | HEIGHT: 63 IN | DIASTOLIC BLOOD PRESSURE: 100 MMHG | HEART RATE: 87 BPM | SYSTOLIC BLOOD PRESSURE: 174 MMHG | WEIGHT: 139.99 LBS

## 2022-09-19 VITALS
RESPIRATION RATE: 16 BRPM | DIASTOLIC BLOOD PRESSURE: 82 MMHG | SYSTOLIC BLOOD PRESSURE: 169 MMHG | OXYGEN SATURATION: 93 % | HEART RATE: 81 BPM

## 2022-09-19 DIAGNOSIS — M48.061 SPINAL STENOSIS, LUMBAR REGION WITHOUT NEUROGENIC CLAUDICATION: ICD-10-CM

## 2022-09-19 DIAGNOSIS — Z98.890 OTHER SPECIFIED POSTPROCEDURAL STATES: Chronic | ICD-10-CM

## 2022-09-19 DIAGNOSIS — Z98.89 OTHER SPECIFIED POSTPROCEDURAL STATES: Chronic | ICD-10-CM

## 2022-09-19 LAB — GLUCOSE BLDC GLUCOMTR-MCNC: 98 MG/DL — SIGNIFICANT CHANGE UP (ref 70–99)

## 2022-09-19 PROCEDURE — U0005: CPT

## 2022-09-19 PROCEDURE — 82962 GLUCOSE BLOOD TEST: CPT

## 2022-09-19 PROCEDURE — 63650 IMPLANT NEUROELECTRODES: CPT

## 2022-09-19 PROCEDURE — 72070 X-RAY EXAM THORAC SPINE 2VWS: CPT

## 2022-09-19 PROCEDURE — 63685 INS/RPLC SPI NPG/RCVR POCKET: CPT

## 2022-09-19 PROCEDURE — U0003: CPT

## 2022-09-19 PROCEDURE — 63650 IMPLANT NEUROELECTRODES: CPT | Mod: XS

## 2022-09-19 PROCEDURE — 72070 X-RAY EXAM THORAC SPINE 2VWS: CPT | Mod: 26

## 2022-09-19 PROCEDURE — C9803: CPT

## 2022-09-19 PROCEDURE — 76000 FLUOROSCOPY <1 HR PHYS/QHP: CPT

## 2022-09-19 PROCEDURE — C1820: CPT

## 2022-09-19 PROCEDURE — C1778: CPT

## 2022-09-19 DEVICE — STIM ADAPTIVE RESTORE II INTELLIS SURESCAN MRI: Type: IMPLANTABLE DEVICE | Status: FUNCTIONAL

## 2022-09-19 DEVICE — LEAD VECTRIS SURESCAN COMPACT 60CM: Type: IMPLANTABLE DEVICE | Status: FUNCTIONAL

## 2022-09-19 RX ORDER — CEPHALEXIN 500 MG
1 CAPSULE ORAL
Qty: 10 | Refills: 0
Start: 2022-09-19 | End: 2022-09-23

## 2022-09-19 RX ORDER — OMEPRAZOLE 10 MG/1
1 CAPSULE, DELAYED RELEASE ORAL
Qty: 0 | Refills: 0 | DISCHARGE

## 2022-09-19 RX ORDER — FUROSEMIDE 40 MG
0.5 TABLET ORAL
Qty: 0 | Refills: 0 | DISCHARGE

## 2022-09-19 RX ORDER — DILTIAZEM HCL 120 MG
1 CAPSULE, EXT RELEASE 24 HR ORAL
Qty: 0 | Refills: 0 | DISCHARGE

## 2022-09-19 RX ORDER — CHOLECALCIFEROL (VITAMIN D3) 125 MCG
1 CAPSULE ORAL
Qty: 0 | Refills: 0 | DISCHARGE

## 2022-09-19 RX ORDER — ALPRAZOLAM 0.25 MG
1 TABLET ORAL
Qty: 0 | Refills: 0 | DISCHARGE

## 2022-09-19 RX ORDER — HYDROMORPHONE HYDROCHLORIDE 2 MG/ML
0.25 INJECTION INTRAMUSCULAR; INTRAVENOUS; SUBCUTANEOUS
Refills: 0 | Status: DISCONTINUED | OUTPATIENT
Start: 2022-09-19 | End: 2022-09-19

## 2022-09-19 RX ORDER — GABAPENTIN 400 MG/1
1 CAPSULE ORAL
Qty: 0 | Refills: 0 | DISCHARGE

## 2022-09-19 RX ORDER — ONDANSETRON 8 MG/1
4 TABLET, FILM COATED ORAL ONCE
Refills: 0 | Status: DISCONTINUED | OUTPATIENT
Start: 2022-09-19 | End: 2022-09-19

## 2022-09-19 RX ORDER — EVOLOCUMAB 140 MG/ML
0 INJECTION, SOLUTION SUBCUTANEOUS
Qty: 0 | Refills: 0 | DISCHARGE

## 2022-09-19 RX ADMIN — SODIUM CHLORIDE 3 MILLILITER(S): 9 INJECTION INTRAMUSCULAR; INTRAVENOUS; SUBCUTANEOUS at 09:40

## 2022-09-19 NOTE — ASU DISCHARGE PLAN (ADULT/PEDIATRIC) - NS MD DC FALL RISK RISK
For information on Fall & Injury Prevention, visit: [email or web address] OR  [email or web address] OR  https://www.cdc.gov/steadi/patient.html

## 2022-09-19 NOTE — ASU DISCHARGE PLAN (ADULT/PEDIATRIC) - CARE PROVIDER_API CALL
Sam Joshi (MD)  Neurosurgery  [address], [address]  [place] [address]  Phone: [phone]  Fax: [phone]  Follow Up Time: 2 weeks

## 2022-09-19 NOTE — ASU PATIENT PROFILE, ADULT - FALL HARM RISK - HARM RISK INTERVENTIONS

## 2022-10-03 ENCOUNTER — APPOINTMENT (OUTPATIENT)
Dept: NEUROSURGERY | Facility: CLINIC | Age: 77
End: 2022-10-03

## 2022-10-03 VITALS
WEIGHT: 140 LBS | BODY MASS INDEX: 24.8 KG/M2 | HEIGHT: 63 IN | SYSTOLIC BLOOD PRESSURE: 173 MMHG | OXYGEN SATURATION: 94 % | HEART RATE: 97 BPM | DIASTOLIC BLOOD PRESSURE: 103 MMHG

## 2022-10-03 PROCEDURE — 99024 POSTOP FOLLOW-UP VISIT: CPT

## 2022-10-03 PROCEDURE — 95972 ALYS CPLX SP/PN NPGT W/PRGRM: CPT

## 2022-10-03 RX ORDER — EVOLOCUMAB 140 MG/ML
140 INJECTION, SOLUTION SUBCUTANEOUS
Qty: 6 | Refills: 0 | Status: ACTIVE | COMMUNITY
Start: 2022-06-27

## 2022-10-03 NOTE — PHYSICAL EXAM
[General Appearance - Alert] : alert [General Appearance - In No Acute Distress] : in no acute distress [FreeTextEntry1] : using rolling walker [Clean] : clean [Dry] : dry [Well-Healed] : well-healed [Intact] : intact [No Drainage] : without drainage [Normal Skin] : normal [Erythema] : not erythematous [Tender] : not tender [Warm] : not warm [Indurated] : not indurated [Normal Skin Turgor] : skin turgor was normal [Oriented To Time, Place, And Person] : oriented to person, place, and time [Impaired Insight] : insight and judgment were intact [Affect] : the affect was normal [Mood] : the mood was normal [Limited Balance] : the patient's balance was impaired [Tremor] : no tremor present [No Visual Abnormalities] : no visible abnormailities [Neck Appearance] : the appearance of the neck was normal [Neck Cervical Mass (___cm)] : no neck mass was observed [Exaggerated Use Of Accessory Muscles For Inspiration] : no accessory muscle use [No Spinal Tenderness] : no spinal tenderness [Involuntary Movements] : no involuntary movements were seen [Motor Tone] : muscle strength and tone were normal [Skin Color & Pigmentation] : normal skin color and pigmentation [Skin Turgor] : normal skin turgor [] : no rash

## 2022-10-03 NOTE — ASSESSMENT
[FreeTextEntry1] : 77 years old woman with long standing lumbar stenosis and failed lumbar fusion with chronic severe back pain and lower extremity pain, parasthenia and weakness. She has had failed conservative pain management including physical therapy, injections and pain medications. Pain has become constant and is interfering with her quality of life. \par \par She underwent a trial of spinal cord stimulator and had 80-90 % reduction in her pain level at her low back and lower extremities. She then underwent a permanent SCS placement.\par \par Wounds are well in process of healing. No signs of infection, inflammation or skin breakage noted. SCS turned on today with help of Tito from [employer]. She tolerated the procedure well and able to retun demonstration of how to use the remote control.\par \par Plan:\par - Return as needed \par \par

## 2022-10-03 NOTE — HISTORY OF PRESENT ILLNESS
[FreeTextEntry1] : The patient reports that she has been suffering from low back pain that radiates into her left leg and foot for multiple years. She had undergone L4-5 laminectomy and fusion in 2018. She reports that having any improvement in her back pain and started to have foot drop and increased pain. She is ambulating with a walker. She has tried medication, epidural injections, physical therapy but none is effective. \par \par She reports the worst pain is in her right big toe especially at night, tingling, cramping and burning sensation in her legs\par \par MRI lumbar spine done on 8/2/2021 showed multilevel severe degenerative disc disease and central canal stenosis with spondylolisthesis and scoliosis. She has been following up with Dr. Meneses who then advised her to come here for an evaluation for spinal cord stimulator trial/ placement. \par \par She underwent SCS trial yesterday. She reports that it helped with pain in her low back and legs significantly. However, she started to have tingling and numbness in her legs and feet. She turned off the stimulator and pain in her back and legs returned and tingling and numbness in her legs and feet is less.\par \par She reports 80-90% pain relief in the first 2 days. However, starting yesterday pain started to return. Xray thoracic spine done today showed leads migrated down. Decision was made to pull out the SCS and move forward with a permanent implant.\par \par She underwent SCS placement on 9/19/22 and is here for wound check and to have SCS turned on with help of Tito from [employer].

## 2022-10-03 NOTE — PROCEDURE
[FreeTextEntry1] : The SCS IPG was interrogated and 30 minutes were spent in complex programming involving frequency, duration, amplitude and impedance checks. She tolerated the programming well and noted good pain control.

## 2022-11-16 NOTE — PHYSICAL EXAM
[Neck Cervical Mass (___cm)] : no neck mass was observed [General Appearance - Alert] : alert [General Appearance - In No Acute Distress] : in no acute distress [General Appearance - Well Nourished] : well nourished Detail Level: Detailed [General Appearance - Well Developed] : well developed Wound Evaluated By: Mikayla Mock DO [Oriented To Time, Place, And Person] : oriented to person, place, and time [Impaired Insight] : insight and judgment were intact [Affect] : the affect was normal [Mood] : the mood was normal [Person] : oriented to person [Place] : oriented to place [Time] : oriented to time [Sensation Tactile Decrease] : light touch was intact [Cranial Nerves Oculomotor (III)] : extraocular motion intact [2+] : Brachioradialis left 2+ [Sclera] : the sclera and conjunctiva were normal [Outer Ear] : the ears and nose were normal in appearance [Neck Appearance] : the appearance of the neck was normal [Musculoskeletal - Swelling] : no joint swelling seen [] : no rash [FreeTextEntry6] : BL-foot weakness, Rt-foot>Lt.  [FreeTextEntry8] : antalgic gait using a walker  [FreeTextEntry1] : mild Lt-tricep tenderness

## 2022-12-19 NOTE — PROCEDURE
[FreeTextEntry1] : The exit site was examined and the tethering sutures were cut. The left lead was removed and checked to ensure its integrity. The right lead was removed and checked to ensure its integrity. The patient tolerated the procedure well with no changes in neurological exam after the procedure
1.64

## 2022-12-30 NOTE — ED ADULT NURSE NOTE - GASTROINTESTINAL ASSESSMENT
Apixaban/Eliquis is used to treat and prevent blood clots. If you are not able to swallow the tablets whole, they may be crushed and mixed in water, apple juice, or applesauce and promptly taken within four hours. Never skip a dose of Apixaban/Eliquis. If you forget to take your Apixaban/Eliquis, take a dose as soon as you remember. If it is almost time for your next Apixaban/Eliquis dose, wait until then and take a regular dose. DO NOT take an extra pill to ‘catch up’.  NEVER TAKE A DOUBLE DOSE. Notify your doctor that you missed a dose. Take Apixaban/Eliquis at the same time each morning and evening. Apixaban/Eliquis may be taken with other medication or food. WDL

## 2023-02-16 NOTE — PATIENT PROFILE ADULT. - TEACHING/LEARNING FACTORS IMPACT ABILITY TO LEARN
Pt is has showered this afternoon, gave Neurontin 100 mg with tylenol for sinus h/a, pt appeared much calmer after afternoon librium was given. Is noted to be out and about on the unit watching tv talks with select peers. no tremors or sweats noted. hearing problems

## 2023-05-03 ENCOUNTER — APPOINTMENT (OUTPATIENT)
Dept: RADIOLOGY | Facility: IMAGING CENTER | Age: 78
End: 2023-05-03
Payer: MEDICARE

## 2023-05-03 ENCOUNTER — OUTPATIENT (OUTPATIENT)
Dept: OUTPATIENT SERVICES | Facility: HOSPITAL | Age: 78
LOS: 1 days | End: 2023-05-03
Payer: MEDICARE

## 2023-05-03 DIAGNOSIS — M48.061 SPINAL STENOSIS, LUMBAR REGION WITHOUT NEUROGENIC CLAUDICATION: ICD-10-CM

## 2023-05-03 DIAGNOSIS — Z98.890 OTHER SPECIFIED POSTPROCEDURAL STATES: Chronic | ICD-10-CM

## 2023-05-03 DIAGNOSIS — Z98.89 OTHER SPECIFIED POSTPROCEDURAL STATES: Chronic | ICD-10-CM

## 2023-05-03 DIAGNOSIS — Z96.89 PRESENCE OF OTHER SPECIFIED FUNCTIONAL IMPLANTS: ICD-10-CM

## 2023-05-03 PROCEDURE — 72070 X-RAY EXAM THORAC SPINE 2VWS: CPT | Mod: 26

## 2023-05-03 PROCEDURE — 72070 X-RAY EXAM THORAC SPINE 2VWS: CPT

## 2023-05-05 ENCOUNTER — APPOINTMENT (OUTPATIENT)
Dept: NEUROSURGERY | Facility: CLINIC | Age: 78
End: 2023-05-05
Payer: MEDICARE

## 2023-05-06 NOTE — H&P PST ADULT - NEUROLOGICAL SYMPTOMS
[[employer] ] : provided by [employer]   [Interpreters_IDNumber] : [id number] [Interpreters_FullName] : [place]  weakness/difficulty walking

## 2023-05-08 ENCOUNTER — APPOINTMENT (OUTPATIENT)
Dept: PAIN MANAGEMENT | Facility: CLINIC | Age: 78
End: 2023-05-08
Payer: MEDICARE

## 2023-05-08 VITALS
DIASTOLIC BLOOD PRESSURE: 86 MMHG | WEIGHT: 140 LBS | BODY MASS INDEX: 24.8 KG/M2 | SYSTOLIC BLOOD PRESSURE: 155 MMHG | HEIGHT: 63 IN | HEART RATE: 97 BPM

## 2023-05-08 PROCEDURE — 99214 OFFICE O/P EST MOD 30 MIN: CPT

## 2023-05-08 NOTE — ASSESSMENT
[FreeTextEntry1] : 78 yo female with long standing lumbar radiculopathy and neuropathic pain   \par Spoke with patient at length. \par \par Taper off gradually neurontin ... does not want to increase dosage at this time. \par Add diclofenac 50 mgs bid. dc ibuprofen.\par Continue PT - patient wants to refrain from PT. until speaks with Dr. Joshi.\par Pending Dr. Joshi re evaluation for neuro stimulation..  \par \par

## 2023-05-08 NOTE — HISTORY OF PRESENT ILLNESS
[FreeTextEntry1] : Patient reports the neurostim is off and is planning to see Dr. Joshi tomorrow. Te pain is not any worse.The pain is in posterior neck region sometimes extending to the left arm. \par Gabapentin 600 mgs bid\par Ibuprofen ? relief.

## 2023-05-09 ENCOUNTER — APPOINTMENT (OUTPATIENT)
Dept: NEUROSURGERY | Facility: CLINIC | Age: 78
End: 2023-05-09
Payer: MEDICARE

## 2023-05-09 ENCOUNTER — NON-APPOINTMENT (OUTPATIENT)
Age: 78
End: 2023-05-09

## 2023-05-09 DIAGNOSIS — M43.16 SPONDYLOLISTHESIS, LUMBAR REGION: ICD-10-CM

## 2023-05-09 DIAGNOSIS — Z96.89 PRESENCE OF OTHER SPECIFIED FUNCTIONAL IMPLANTS: ICD-10-CM

## 2023-05-09 DIAGNOSIS — Z98.1 ARTHRODESIS STATUS: ICD-10-CM

## 2023-05-09 PROCEDURE — 99213 OFFICE O/P EST LOW 20 MIN: CPT

## 2023-05-09 PROCEDURE — 95972 ALYS CPLX SP/PN NPGT W/PRGRM: CPT

## 2023-05-10 ENCOUNTER — APPOINTMENT (OUTPATIENT)
Dept: PLASTIC SURGERY | Facility: CLINIC | Age: 78
End: 2023-05-10
Payer: MEDICARE

## 2023-05-10 DIAGNOSIS — N60.82 OTHER BENIGN MAMMARY DYSPLASIAS OF LEFT BREAST: ICD-10-CM

## 2023-05-10 DIAGNOSIS — R92.8 OTHER ABNORMAL AND INCONCLUSIVE FINDINGS ON DIAGNOSTIC IMAGING OF BREAST: ICD-10-CM

## 2023-05-10 PROCEDURE — 99213 OFFICE O/P EST LOW 20 MIN: CPT

## 2023-05-10 NOTE — CONSULT LETTER
[Dear  ___] : Dear  [unfilled], [Courtesy Letter:] : I had the pleasure of seeing your patient, [unfilled], in my office today. [Please see my note below.] : Please see my note below. [Sincerely,] : Sincerely, [FreeTextEntry3] : Susan M. Palleschi, MD, FACS\par Division of Breast Surgery\par Director, Breast Surgery\par [facility]\par [address] [place] [address]\par [address]\[place] [address]\par (Phone) [phone]\par (Fax) [phone]

## 2023-05-10 NOTE — ASSESSMENT
[FreeTextEntry1] : Left sebaceous cyst\par Right axillary lymph node with cortical thickening on ultrasound\par Clinical breast exam benign \par \par 1. Annual bilateral mammogram due 10/2023\par 2. Follow up office visit due as needed\par 3. Advised monthly self breast examinations and advised her to contact me if she has any concerns. \par 4. 6 month follow right axillary ultrasound due now\par \par Patient seen and examined with my PA Cris Owens present

## 2023-05-10 NOTE — PHYSICAL EXAM
[Normocephalic] : normocephalic [Atraumatic] : atraumatic [EOMI] : extra ocular movement intact [Sclera nonicteric] : sclera nonicteric [Supple] : supple [No Supraclavicular Adenopathy] : no supraclavicular adenopathy [Examined in the supine and seated position] : examined in the supine and seated position [No dominant masses] : no dominant masses in right breast  [No dominant masses] : no dominant masses left breast [No Nipple Retraction] : no left nipple retraction [No Nipple Discharge] : no left nipple discharge [No Axillary Lymphadenopathy] : no left axillary lymphadenopathy [No Edema] : no edema [No Rashes] : no rashes [No Ulceration] : no ulceration [No Cervical Adenopathy] : no cervical adenopathy [No Thyromegaly] : no thyromegaly [de-identified] : Well-healed lower outer PA incision.\par 10:00 (N13cm) sebaceous cyst 8 x 6 mm, no e/o infection.

## 2023-05-10 NOTE — HISTORY OF PRESENT ILLNESS
[FreeTextEntry1] : She has a history of an excisional left breast biopsy in 1979 reportedly benign\par 10/13/2021 Left axillary ultrasound guided core biopsy with heart shaped clip placement: pathology reactive lymph node\par 7/21/2022 Left axillary ultrasound: stable reactive lymph node. BI-RADS 2\par 10/12/2022 Bilateral mammogram: dense\par Bilateral ultrasound: Right 9-10:00 (N5cm) 0.6 x 0.2 x 0.5 cm oval isoechoic probable lobule of benign breast tissue without significant change; Left oval rounded circumscribed subcutaneous lesion most consistent with an inclusion cyst 10:00 (N12cm) not changed measuring 1.1 x 0.6 x 1.1 cm; Right axillary 1.0 x 0.7 x 1.0 cm showing diffuse cortical thickening to 0.4 cm now seen. The previously biopsied oval lymph node with an associated biopsy clip showing diffuse cortical thickening and without a visible fatty hilum is probably not significantly changes in size. BI-RADS 3, advise right ultrasound in 6 months\par She is here with her \par She states she has had some falls lately.\par She denies any new breast concerns. She still notes the left breast sebaceous cyst that sometimes gets bigger and has drainage.

## 2023-05-22 NOTE — ASSESSMENT
[FreeTextEntry1] : 77 years old woman with long standing lumbar stenosis and failed lumbar fusion with chronic severe back pain and lower extremity pain, parasthenia and weakness. She has had failed conservative pain management including physical therapy, injections and pain medications. Pain has become constant and is interfering with her quality of life. \par \par She underwent a trial of spinal cord stimulator and had 80-90 % reduction in her pain level at her low back and lower extremities. She then underwent a permanent SCS placement.\par \par She enjoyed improvement in her pain control for several months but more recently she has been having increased pain with difficulty with the remote control. Xray done last week did not show significant lead movement. She is here today for SCS interrogation and programming with help of Carmita from [employer]\par \par Plan:\par - Follow up as needed\par \par \par

## 2023-05-22 NOTE — HISTORY OF PRESENT ILLNESS
[FreeTextEntry1] : The patient reports that she has been suffering from low back pain that radiates into her left leg and foot for multiple years. She had undergone L4-5 laminectomy and fusion in 2018. She reports that having any improvement in her back pain and started to have foot drop and increased pain. She is ambulating with a walker. She has tried medication, epidural injections, physical therapy but none is effective. \par \par She reports the worst pain is in her right big toe especially at night, tingling, cramping and burning sensation in her legs\par \par MRI lumbar spine done on 8/2/2021 showed multilevel severe degenerative disc disease and central canal stenosis with spondylolisthesis and scoliosis. She has been following up with Dr. Meneses who then advised her to come here for an evaluation for spinal cord stimulator trial/ placement. \par \par She underwent SCS trial yesterday. She reports that it helped with pain in her low back and legs significantly. However, she started to have tingling and numbness in her legs and feet. She turned off the stimulator and pain in her back and legs returned and tingling and numbness in her legs and feet is less.\par \par She reports 80-90% pain relief in the first 2 days. However, starting yesterday pain started to return. Xray thoracic spine done today showed leads migrated down. Decision was made to pull out the SCS and move forward with a permanent implant.\par \par She underwent SCS placement on 9/19/22. She enjoyed improvement in her pain control for several months but more recently she has been having increased pain with difficulty with the remote control. Xray done last week did not show significant lead movement.

## 2023-06-09 NOTE — ASU PATIENT PROFILE, ADULT - AS SC BRADEN NUTRITION
(3) adequate no loss of consciousness, no gait abnormality, no headache, no sensory deficits, and no weakness.

## 2023-07-13 NOTE — CHART NOTE - NSCHARTNOTESELECT_GEN_ALL_CORE
Event Note/Fever 101.3 You can access the FollowMyHealth Patient Portal offered by [facility] by registering at the following website: [email or web address]. By joining [employer]’s FollowMyHealth portal, you will also be able to view your health information using other applications (apps) compatible with our system.

## 2023-08-02 ENCOUNTER — APPOINTMENT (OUTPATIENT)
Dept: INTERNAL MEDICINE | Facility: CLINIC | Age: 78
End: 2023-08-02
Payer: MEDICARE

## 2023-08-02 VITALS
HEART RATE: 84 BPM | HEIGHT: 63 IN | DIASTOLIC BLOOD PRESSURE: 82 MMHG | SYSTOLIC BLOOD PRESSURE: 148 MMHG | BODY MASS INDEX: 24.27 KG/M2 | WEIGHT: 137 LBS | RESPIRATION RATE: 14 BRPM

## 2023-08-02 DIAGNOSIS — Z00.00 ENCOUNTER FOR GENERAL ADULT MEDICAL EXAMINATION W/OUT ABNORMAL FINDINGS: ICD-10-CM

## 2023-08-02 PROCEDURE — 36415 COLL VENOUS BLD VENIPUNCTURE: CPT

## 2023-08-02 PROCEDURE — 99204 OFFICE O/P NEW MOD 45 MIN: CPT | Mod: 25

## 2023-08-02 PROCEDURE — G0439: CPT

## 2023-08-02 RX ORDER — CEPHALEXIN 250 MG/1
250 CAPSULE ORAL
Qty: 10 | Refills: 0 | Status: DISCONTINUED | COMMUNITY
Start: 2022-09-19 | End: 2023-08-02

## 2023-08-02 RX ORDER — OXYCODONE AND ACETAMINOPHEN 5; 325 MG/1; MG/1
5-325 TABLET ORAL
Qty: 12 | Refills: 0 | Status: DISCONTINUED | COMMUNITY
Start: 2022-09-19 | End: 2023-08-02

## 2023-08-02 RX ORDER — DICLOFENAC POTASSIUM 50 MG/1
50 TABLET, COATED ORAL TWICE DAILY
Qty: 60 | Refills: 0 | Status: DISCONTINUED | COMMUNITY
Start: 2023-05-08 | End: 2023-08-02

## 2023-08-02 RX ORDER — CEPHALEXIN 500 MG/1
500 CAPSULE ORAL
Qty: 20 | Refills: 0 | Status: DISCONTINUED | COMMUNITY
Start: 2022-08-22 | End: 2023-08-02

## 2023-08-10 NOTE — HISTORY OF PRESENT ILLNESS
[FreeTextEntry1] : AKBAR CARLOS is a 78 year old female  presents for an annual physical. Patient is also here for f/u of chronic medical conditions, which have been stable on medications. These include HTN, hyperlipidemia, GERD, and low Vitamin D [de-identified] : Here to establish care no acute complaints except for chronic lower extremity edema

## 2023-08-10 NOTE — ASSESSMENT
[FreeTextEntry1] : Blood work was drawn and sent to the lab today. The patient has been instructed to call the office next week to discuss today's lab work. RN Meds  For LE Doppler to r/o DVT  Increase lasix to 20mg daily leg elevation  low salt diet  f/u 2-3 weeks

## 2023-08-10 NOTE — PHYSICAL EXAM
[Normal] : normal rate, regular rhythm, normal S1 and S2 and no murmur heard [de-identified] : b/l 1+ le edema to mid shin

## 2023-08-28 ENCOUNTER — APPOINTMENT (OUTPATIENT)
Dept: INTERNAL MEDICINE | Facility: CLINIC | Age: 78
End: 2023-08-28
Payer: MEDICARE

## 2023-08-28 VITALS — SYSTOLIC BLOOD PRESSURE: 160 MMHG | RESPIRATION RATE: 14 BRPM | HEART RATE: 78 BPM | DIASTOLIC BLOOD PRESSURE: 80 MMHG

## 2023-08-28 VITALS — WEIGHT: 137 LBS | BODY MASS INDEX: 24.27 KG/M2 | HEIGHT: 63 IN

## 2023-08-28 LAB
APPEARANCE: CLEAR
BASOPHILS # BLD AUTO: 0.08 K/UL
BASOPHILS NFR BLD AUTO: 0.6 %
BILIRUBIN URINE: NEGATIVE
BLOOD URINE: NEGATIVE
COLOR: YELLOW
DEPRECATED D DIMER PPP IA-ACNC: 252 NG/ML DDU
EOSINOPHIL # BLD AUTO: 0.33 K/UL
EOSINOPHIL NFR BLD AUTO: 2.6 %
GLUCOSE QUALITATIVE U: NEGATIVE MG/DL
HCT VFR BLD CALC: 43.9 %
HGB BLD-MCNC: 14 G/DL
IMM GRANULOCYTES NFR BLD AUTO: 0.5 %
KETONES URINE: NEGATIVE MG/DL
LEUKOCYTE ESTERASE URINE: NEGATIVE
LYMPHOCYTES # BLD AUTO: 2.74 K/UL
LYMPHOCYTES NFR BLD AUTO: 21.3 %
MAN DIFF?: NORMAL
MCHC RBC-ENTMCNC: 27.8 PG
MCHC RBC-ENTMCNC: 31.9 GM/DL
MCV RBC AUTO: 87.3 FL
MONOCYTES # BLD AUTO: 0.97 K/UL
MONOCYTES NFR BLD AUTO: 7.5 %
NEUTROPHILS # BLD AUTO: 8.69 K/UL
NEUTROPHILS NFR BLD AUTO: 67.5 %
NITRITE URINE: NEGATIVE
NT-PROBNP SERPL-MCNC: 43 PG/ML
PH URINE: 7.5
PLATELET # BLD AUTO: 339 K/UL
PROTEIN URINE: NEGATIVE MG/DL
RBC # BLD: 5.03 M/UL
RBC # FLD: 13.2 %
SPECIFIC GRAVITY URINE: 1.02
T4 FREE SERPL-MCNC: 1.3 NG/DL
T4 SERPL-MCNC: 8.6 UG/DL
TSH SERPL-ACNC: 0.74 UIU/ML
UROBILINOGEN URINE: 1 MG/DL
WBC # FLD AUTO: 12.87 K/UL

## 2023-08-28 PROCEDURE — 99214 OFFICE O/P EST MOD 30 MIN: CPT | Mod: 25

## 2023-08-28 PROCEDURE — 36415 COLL VENOUS BLD VENIPUNCTURE: CPT

## 2023-08-28 RX ORDER — ENALAPRIL MALEATE 5 MG/1
5 TABLET ORAL
Refills: 0 | Status: DISCONTINUED | COMMUNITY
End: 2023-08-28

## 2023-08-28 RX ORDER — LISINOPRIL 30 MG/1
TABLET ORAL
Refills: 0 | Status: DISCONTINUED | COMMUNITY
End: 2023-08-28

## 2023-08-28 NOTE — ASSESSMENT
[FreeTextEntry1] : Blood work was drawn and sent to the lab today. The patient has been instructed to call the office next week to discuss today's lab work. RN Meds  For LE Doppler to r/o DVT  Continue lasix 20mg daily leg elevation  low salt diet Increase enalapril 10 mg daily  f/u 3-4  weeks  F/U with pain management Dr Meneses

## 2023-08-28 NOTE — PHYSICAL EXAM
[Normal] : normal rate, regular rhythm, normal S1 and S2 and no murmur heard [de-identified] : 1+ b/l ankle edema

## 2023-08-28 NOTE — HISTORY OF PRESENT ILLNESS
[de-identified] : Pt presents for evaluation of HTN, hyperlipidemia, LE edema, chronic pain. No issues with daily lasix states her legs are "perfect" in the AM, but get mildly swollen as day goes along. no dizziness or lightheadedness. just finished moving into [facility]. lots of stress

## 2023-08-31 ENCOUNTER — APPOINTMENT (OUTPATIENT)
Dept: PAIN MANAGEMENT | Facility: CLINIC | Age: 78
End: 2023-08-31
Payer: MEDICARE

## 2023-08-31 VITALS
DIASTOLIC BLOOD PRESSURE: 102 MMHG | BODY MASS INDEX: 24.27 KG/M2 | HEIGHT: 63 IN | HEART RATE: 100 BPM | WEIGHT: 137 LBS | SYSTOLIC BLOOD PRESSURE: 177 MMHG

## 2023-08-31 PROCEDURE — 20552 NJX 1/MLT TRIGGER POINT 1/2: CPT | Mod: 26

## 2023-08-31 PROCEDURE — 99213 OFFICE O/P EST LOW 20 MIN: CPT | Mod: 25

## 2023-09-06 LAB
ALBUMIN SERPL ELPH-MCNC: 4.7 G/DL
ALP BLD-CCNC: 139 U/L
ALT SERPL-CCNC: 32 U/L
ANION GAP SERPL CALC-SCNC: 14 MMOL/L
AST SERPL-CCNC: 31 U/L
BILIRUB SERPL-MCNC: 0.3 MG/DL
BUN SERPL-MCNC: 14 MG/DL
CALCIUM SERPL-MCNC: 10.6 MG/DL
CHLORIDE SERPL-SCNC: 102 MMOL/L
CO2 SERPL-SCNC: 23 MMOL/L
CREAT SERPL-MCNC: 0.64 MG/DL
EGFR: 91 ML/MIN/1.73M2
GLUCOSE SERPL-MCNC: 82 MG/DL
POTASSIUM SERPL-SCNC: 4.5 MMOL/L
PROT SERPL-MCNC: 6.9 G/DL
SODIUM SERPL-SCNC: 139 MMOL/L

## 2023-09-10 NOTE — ASSESSMENT
[FreeTextEntry1] : Cervical myofascial pain syndrome Will proceed with local trigger point injection.

## 2023-09-10 NOTE — PROCEDURE
[FreeTextEntry1] : Consent signed. Risks and benefits were discussed. Alternative options were also discussed. Patient was in seated position. Skin was prepped and alcohol was placed on area of injection. Cervical paraspinal injection was done using a 27 and 1.25 inch needle using 1%Lidocaine and 20mg of Depomedrol for a total of 5cc. Patient observed for 5 mins following procedure. No complications.

## 2023-09-10 NOTE — PHYSICAL EXAM
[FreeTextEntry1] : Constitutional: No signs of distress or signs of toxicity.  Mental Status: Alert and well oriented. Speech fluent. No aphasia. Fund of knowledge intact.  Psychiatric: Mood stable. Gait: non antalgic or ataxic. Eyes: no redness or swelling HEENT: intact; no signs of trauma. Neck: No masses noted; cervical paraspinal tenderness to palpation with restricted ROM in all directions. Pulmonary: no respiratory distress Skin: No rash.

## 2023-09-10 NOTE — HISTORY OF PRESENT ILLNESS
[FreeTextEntry1] : Since last visit, [facility] She is off diclofenac due to lack of effect. The neck pain is a lot worse than the back pain interfering with her QOL.

## 2023-09-27 ENCOUNTER — APPOINTMENT (OUTPATIENT)
Dept: INTERNAL MEDICINE | Facility: CLINIC | Age: 78
End: 2023-09-27
Payer: MEDICARE

## 2023-09-27 VITALS — DIASTOLIC BLOOD PRESSURE: 80 MMHG | SYSTOLIC BLOOD PRESSURE: 158 MMHG | HEART RATE: 78 BPM | RESPIRATION RATE: 14 BRPM

## 2023-09-27 DIAGNOSIS — Z23 ENCOUNTER FOR IMMUNIZATION: ICD-10-CM

## 2023-09-27 PROCEDURE — 99214 OFFICE O/P EST MOD 30 MIN: CPT | Mod: 25

## 2023-09-27 PROCEDURE — G0008: CPT

## 2023-09-27 PROCEDURE — 90662 IIV NO PRSV INCREASED AG IM: CPT

## 2023-09-27 RX ORDER — ENALAPRIL MALEATE 5 MG/1
5 TABLET ORAL DAILY
Qty: 90 | Refills: 0 | Status: ACTIVE | COMMUNITY
Start: 2023-09-27 | End: 1900-01-01

## 2023-09-29 ENCOUNTER — APPOINTMENT (OUTPATIENT)
Dept: PAIN MANAGEMENT | Facility: CLINIC | Age: 78
End: 2023-09-29
Payer: MEDICARE

## 2023-09-29 VITALS
SYSTOLIC BLOOD PRESSURE: 158 MMHG | DIASTOLIC BLOOD PRESSURE: 91 MMHG | HEART RATE: 99 BPM | BODY MASS INDEX: 24.27 KG/M2 | WEIGHT: 137 LBS | HEIGHT: 63 IN

## 2023-09-29 PROCEDURE — 99213 OFFICE O/P EST LOW 20 MIN: CPT

## 2023-10-19 ENCOUNTER — RX RENEWAL (OUTPATIENT)
Age: 78
End: 2023-10-19

## 2023-10-23 ENCOUNTER — RX RENEWAL (OUTPATIENT)
Age: 78
End: 2023-10-23

## 2023-10-27 NOTE — ED PROVIDER NOTE - MUSCULOSKELETAL, MLM
h/o diverticulitis/constipation Spine appears normal, range of motion is not limited, no muscle or joint tenderness

## 2023-11-17 ENCOUNTER — APPOINTMENT (OUTPATIENT)
Dept: INTERNAL MEDICINE | Facility: CLINIC | Age: 78
End: 2023-11-17
Payer: MEDICARE

## 2023-11-17 VITALS — HEART RATE: 84 BPM | RESPIRATION RATE: 14 BRPM | SYSTOLIC BLOOD PRESSURE: 138 MMHG | DIASTOLIC BLOOD PRESSURE: 86 MMHG

## 2023-11-17 PROCEDURE — 99214 OFFICE O/P EST MOD 30 MIN: CPT

## 2023-12-11 ENCOUNTER — RX RENEWAL (OUTPATIENT)
Age: 78
End: 2023-12-11

## 2023-12-21 ENCOUNTER — APPOINTMENT (OUTPATIENT)
Dept: PAIN MANAGEMENT | Facility: CLINIC | Age: 78
End: 2023-12-21

## 2024-02-05 ENCOUNTER — RX RENEWAL (OUTPATIENT)
Age: 79
End: 2024-02-05

## 2024-02-16 ENCOUNTER — APPOINTMENT (OUTPATIENT)
Dept: INTERNAL MEDICINE | Facility: CLINIC | Age: 79
End: 2024-02-16
Payer: MEDICARE

## 2024-02-16 VITALS — RESPIRATION RATE: 14 BRPM | DIASTOLIC BLOOD PRESSURE: 82 MMHG | SYSTOLIC BLOOD PRESSURE: 138 MMHG | HEART RATE: 78 BPM

## 2024-02-16 PROCEDURE — 36415 COLL VENOUS BLD VENIPUNCTURE: CPT

## 2024-02-16 PROCEDURE — 99214 OFFICE O/P EST MOD 30 MIN: CPT

## 2024-02-16 PROCEDURE — G2211 COMPLEX E/M VISIT ADD ON: CPT

## 2024-02-16 RX ORDER — FUROSEMIDE 20 MG/1
20 TABLET ORAL
Qty: 90 | Refills: 1 | Status: ACTIVE | COMMUNITY
Start: 2021-11-12 | End: 1900-01-01

## 2024-02-16 RX ORDER — ENALAPRIL MALEATE 10 MG/1
10 TABLET ORAL
Qty: 90 | Refills: 1 | Status: ACTIVE | COMMUNITY
Start: 2023-08-28 | End: 1900-01-01

## 2024-02-16 NOTE — PHYSICAL EXAM
[Normal] : normal rate, regular rhythm, normal S1 and S2 and no murmur heard [de-identified] : 1+ b/l ankle edema

## 2024-02-16 NOTE — ASSESSMENT
[FreeTextEntry1] : Blood work was drawn and sent to the lab today. The patient has been instructed to call the office next week to discuss today's lab work.  continue current meds f/u 3 months

## 2024-02-16 NOTE — HISTORY OF PRESENT ILLNESS
[de-identified] : Pt presents for evaluation of HTN, hyperlipidemia, LE edema, chronic pain. No issues with daily lasix states her legs are "perfect" in the AM, but get mildly swollen as day goes along. no dizziness or lightheadedness. has been seeing pain management   no acute complaints today

## 2024-03-25 LAB
ALBUMIN SERPL ELPH-MCNC: 4.6 G/DL
ALP BLD-CCNC: 158 U/L
ALT SERPL-CCNC: 29 U/L
ANION GAP SERPL CALC-SCNC: 10 MMOL/L
AST SERPL-CCNC: 21 U/L
BILIRUB SERPL-MCNC: 0.4 MG/DL
BUN SERPL-MCNC: 17 MG/DL
CALCIUM SERPL-MCNC: 10.5 MG/DL
CHLORIDE SERPL-SCNC: 101 MMOL/L
CHOLEST SERPL-MCNC: 195 MG/DL
CO2 SERPL-SCNC: 26 MMOL/L
CREAT SERPL-MCNC: 0.65 MG/DL
EGFR: 91 ML/MIN/1.73M2
GLUCOSE SERPL-MCNC: 88 MG/DL
HDLC SERPL-MCNC: 66 MG/DL
LDLC SERPL CALC-MCNC: 108 MG/DL
NONHDLC SERPL-MCNC: 128 MG/DL
POTASSIUM SERPL-SCNC: 4.7 MMOL/L
PROT SERPL-MCNC: 7.1 G/DL
SODIUM SERPL-SCNC: 137 MMOL/L
TRIGL SERPL-MCNC: 112 MG/DL

## 2024-05-06 ENCOUNTER — RX RENEWAL (OUTPATIENT)
Age: 79
End: 2024-05-06

## 2024-05-06 RX ORDER — POTASSIUM CHLORIDE 750 MG/1
10 CAPSULE, EXTENDED RELEASE ORAL
Qty: 90 | Refills: 1 | Status: ACTIVE | COMMUNITY
Start: 2023-11-17 | End: 1900-01-01

## 2024-05-17 ENCOUNTER — APPOINTMENT (OUTPATIENT)
Dept: INTERNAL MEDICINE | Facility: CLINIC | Age: 79
End: 2024-05-17
Payer: MEDICARE

## 2024-05-17 DIAGNOSIS — M48.061 SPINAL STENOSIS, LUMBAR REGION WITHOUT NEUROGENIC CLAUDICATION: ICD-10-CM

## 2024-05-17 DIAGNOSIS — R60.0 LOCALIZED EDEMA: ICD-10-CM

## 2024-05-17 DIAGNOSIS — E83.52 HYPERCALCEMIA: ICD-10-CM

## 2024-05-17 DIAGNOSIS — I10 ESSENTIAL (PRIMARY) HYPERTENSION: ICD-10-CM

## 2024-05-17 PROCEDURE — 99214 OFFICE O/P EST MOD 30 MIN: CPT

## 2024-05-17 PROCEDURE — 36415 COLL VENOUS BLD VENIPUNCTURE: CPT

## 2024-05-17 PROCEDURE — G2211 COMPLEX E/M VISIT ADD ON: CPT

## 2024-05-17 RX ORDER — DILTIAZEM HYDROCHLORIDE 360 MG/1
360 CAPSULE, EXTENDED RELEASE ORAL
Qty: 90 | Refills: 0 | Status: ACTIVE | COMMUNITY
Start: 2024-05-17 | End: 1900-01-01

## 2024-05-17 RX ORDER — BACLOFEN 10 MG/1
10 TABLET ORAL
Qty: 60 | Refills: 0 | Status: DISCONTINUED | COMMUNITY
Start: 2023-10-03 | End: 2024-05-17

## 2024-05-22 DIAGNOSIS — R74.8 ABNORMAL LEVELS OF OTHER SERUM ENZYMES: ICD-10-CM

## 2024-05-22 LAB
ALBUMIN SERPL ELPH-MCNC: 4.6 G/DL
ALP BLD-CCNC: 176 U/L
ALT SERPL-CCNC: 39 U/L
ANION GAP SERPL CALC-SCNC: 15 MMOL/L
AST SERPL-CCNC: 30 U/L
BILIRUB SERPL-MCNC: 0.3 MG/DL
BUN SERPL-MCNC: 18 MG/DL
CALCIUM SERPL-MCNC: 11.4 MG/DL
CHLORIDE SERPL-SCNC: 103 MMOL/L
CHOLEST SERPL-MCNC: 212 MG/DL
CO2 SERPL-SCNC: 22 MMOL/L
CREAT SERPL-MCNC: 0.67 MG/DL
EGFR: 89 ML/MIN/1.73M2
ESTIMATED AVERAGE GLUCOSE: 94 MG/DL
GLUCOSE SERPL-MCNC: 68 MG/DL
HBA1C MFR BLD HPLC: 4.9 %
HDLC SERPL-MCNC: 72 MG/DL
LDLC SERPL CALC-MCNC: 117 MG/DL
NONHDLC SERPL-MCNC: 140 MG/DL
POTASSIUM SERPL-SCNC: 4.1 MMOL/L
PROT SERPL-MCNC: 7 G/DL
SODIUM SERPL-SCNC: 140 MMOL/L
TRIGL SERPL-MCNC: 133 MG/DL

## 2024-05-24 ENCOUNTER — APPOINTMENT (OUTPATIENT)
Dept: PAIN MANAGEMENT | Facility: CLINIC | Age: 79
End: 2024-05-24

## 2024-05-27 RX ORDER — GABAPENTIN 300 MG/1
300 CAPSULE ORAL
Qty: 180 | Refills: 0 | Status: ACTIVE | COMMUNITY
Start: 2020-11-16 | End: 1900-01-01

## 2024-05-29 LAB
ALP BLD-CCNC: 179 IU/L
ALP BONE CFR SERPL: 31 %
ALP INTEST CFR SERPL: 7 %
ALP LIVER CFR SERPL: 62 %
CALCIUM SERPL-MCNC: 11.4 MG/DL
PARATHYROID HORMONE INTACT: 37 PG/ML

## 2024-05-31 PROBLEM — M48.061 LUMBAR STENOSIS: Status: ACTIVE | Noted: 2017-05-01

## 2024-05-31 PROBLEM — R60.0 BILATERAL LEG EDEMA: Status: ACTIVE | Noted: 2023-08-02

## 2024-05-31 PROBLEM — I10 BENIGN HYPERTENSION: Status: ACTIVE | Noted: 2023-08-10

## 2024-05-31 PROBLEM — E83.52 HYPERCALCEMIA: Status: ACTIVE | Noted: 2024-05-22

## 2024-05-31 NOTE — PHYSICAL EXAM
[Normal] : normal rate, regular rhythm, normal S1 and S2 and no murmur heard [de-identified] : 1+ b/l ankle edema

## 2024-05-31 NOTE — HISTORY OF PRESENT ILLNESS
[de-identified] : Pt presents for evaluation of HTN, hyperlipidemia, LE edema, chronic pain. No issues with daily lasix states her legs are "perfect" in the AM, but get mildly swollen as day goes along. no dizziness or lightheadedness. has been seeing pain management   no acute complaints today

## 2024-06-28 LAB
ALBUMIN SERPL ELPH-MCNC: 4.5 G/DL
ALP BLD-CCNC: 187 U/L
ALP BLD-CCNC: 196 IU/L
ALP BONE CFR SERPL: 39 %
ALP INTEST CFR SERPL: 10 %
ALP LIVER CFR SERPL: 51 %
ALT SERPL-CCNC: 45 U/L
ANION GAP SERPL CALC-SCNC: 15 MMOL/L
AST SERPL-CCNC: 32 U/L
BILIRUB SERPL-MCNC: 0.3 MG/DL
BUN SERPL-MCNC: 22 MG/DL
CALCIUM SERPL-MCNC: 9.2 MG/DL
CALCIUM SERPL-MCNC: 9.2 MG/DL
CHLORIDE SERPL-SCNC: 102 MMOL/L
CO2 SERPL-SCNC: 18 MMOL/L
CREAT SERPL-MCNC: 0.61 MG/DL
EGFR: 91 ML/MIN/1.73M2
GLUCOSE SERPL-MCNC: 96 MG/DL
PARATHYROID HORMONE INTACT: 118 PG/ML
POTASSIUM SERPL-SCNC: 4.6 MMOL/L
PROT SERPL-MCNC: 6.6 G/DL
SODIUM SERPL-SCNC: 136 MMOL/L

## 2024-07-15 ENCOUNTER — RX RENEWAL (OUTPATIENT)
Age: 79
End: 2024-07-15

## 2024-08-13 ENCOUNTER — RX RENEWAL (OUTPATIENT)
Age: 79
End: 2024-08-13

## 2024-08-23 ENCOUNTER — APPOINTMENT (OUTPATIENT)
Dept: INTERNAL MEDICINE | Facility: CLINIC | Age: 79
End: 2024-08-23
Payer: MEDICARE

## 2024-08-23 VITALS — RESPIRATION RATE: 14 BRPM | SYSTOLIC BLOOD PRESSURE: 138 MMHG | HEART RATE: 72 BPM | DIASTOLIC BLOOD PRESSURE: 82 MMHG

## 2024-08-23 DIAGNOSIS — E83.52 HYPERCALCEMIA: ICD-10-CM

## 2024-08-23 DIAGNOSIS — I10 ESSENTIAL (PRIMARY) HYPERTENSION: ICD-10-CM

## 2024-08-23 DIAGNOSIS — K21.9 GASTRO-ESOPHAGEAL REFLUX DISEASE W/OUT ESOPHAGITIS: ICD-10-CM

## 2024-08-23 DIAGNOSIS — R60.0 LOCALIZED EDEMA: ICD-10-CM

## 2024-08-23 PROCEDURE — G2211 COMPLEX E/M VISIT ADD ON: CPT

## 2024-08-23 PROCEDURE — 36415 COLL VENOUS BLD VENIPUNCTURE: CPT

## 2024-08-23 PROCEDURE — 99214 OFFICE O/P EST MOD 30 MIN: CPT

## 2024-08-23 NOTE — HISTORY OF PRESENT ILLNESS
[de-identified] : Pt presents for evaluation of HTN, hyperlipidemia, LE edema, chronic pain. No issues with daily lasix states her legs are "perfect" in the AM, but get mildly swollen as day goes along. no dizziness or lightheadedness. has been unable to see pain management takes gabapentin twice daily no acute complaints today

## 2024-08-23 NOTE — ASSESSMENT
[FreeTextEntry1] : Blood work was drawn and sent to the lab today. The patient has been instructed to call the office next week to discuss today's lab work.  continue current meds advised to  increase gabapentin at night to 900 mg pain management f/harley  f/u 3 months for CPE

## 2024-08-23 NOTE — PHYSICAL EXAM
[Normal] : normal rate, regular rhythm, normal S1 and S2 and no murmur heard [de-identified] : 1+ b/l ankle edema

## 2024-08-27 DIAGNOSIS — Z12.39 ENCOUNTER FOR OTHER SCREENING FOR MALIGNANT NEOPLASM OF BREAST: ICD-10-CM

## 2024-08-30 LAB
ALBUMIN SERPL ELPH-MCNC: 4.6 G/DL
ALP BLD-CCNC: 138 U/L
ALT SERPL-CCNC: 35 U/L
ANION GAP SERPL CALC-SCNC: 14 MMOL/L
AST SERPL-CCNC: 28 U/L
BILIRUB SERPL-MCNC: 0.2 MG/DL
BUN SERPL-MCNC: 24 MG/DL
CALCIUM SERPL-MCNC: 10.3 MG/DL
CALCIUM SERPL-MCNC: 10.3 MG/DL
CHLORIDE SERPL-SCNC: 102 MMOL/L
CHOLEST SERPL-MCNC: 204 MG/DL
CO2 SERPL-SCNC: 20 MMOL/L
CREAT SERPL-MCNC: 0.69 MG/DL
EGFR: 89 ML/MIN/1.73M2
GLUCOSE SERPL-MCNC: 92 MG/DL
HDLC SERPL-MCNC: 66 MG/DL
LDLC SERPL CALC-MCNC: 115 MG/DL
NONHDLC SERPL-MCNC: 138 MG/DL
PARATHYROID HORMONE INTACT: 80 PG/ML
POTASSIUM SERPL-SCNC: 4.5 MMOL/L
PROT SERPL-MCNC: 7 G/DL
SODIUM SERPL-SCNC: 136 MMOL/L
TRIGL SERPL-MCNC: 131 MG/DL

## 2024-10-02 ENCOUNTER — RX RENEWAL (OUTPATIENT)
Age: 79
End: 2024-10-02

## 2024-10-30 ENCOUNTER — RX RENEWAL (OUTPATIENT)
Age: 79
End: 2024-10-30

## 2024-11-19 ENCOUNTER — APPOINTMENT (OUTPATIENT)
Dept: INTERNAL MEDICINE | Facility: CLINIC | Age: 79
End: 2024-11-19

## 2024-11-19 ENCOUNTER — LABORATORY RESULT (OUTPATIENT)
Age: 79
End: 2024-11-19

## 2024-11-19 ENCOUNTER — NON-APPOINTMENT (OUTPATIENT)
Age: 79
End: 2024-11-19

## 2024-11-19 VITALS — WEIGHT: 140 LBS | BODY MASS INDEX: 24.8 KG/M2 | HEIGHT: 63 IN

## 2024-11-19 DIAGNOSIS — Z23 ENCOUNTER FOR IMMUNIZATION: ICD-10-CM

## 2024-11-19 DIAGNOSIS — K21.9 GASTRO-ESOPHAGEAL REFLUX DISEASE W/OUT ESOPHAGITIS: ICD-10-CM

## 2024-11-19 DIAGNOSIS — I10 ESSENTIAL (PRIMARY) HYPERTENSION: ICD-10-CM

## 2024-11-19 DIAGNOSIS — R60.0 LOCALIZED EDEMA: ICD-10-CM

## 2024-11-19 DIAGNOSIS — Z00.00 ENCOUNTER FOR GENERAL ADULT MEDICAL EXAMINATION W/OUT ABNORMAL FINDINGS: ICD-10-CM

## 2024-11-19 DIAGNOSIS — R74.8 ABNORMAL LEVELS OF OTHER SERUM ENZYMES: ICD-10-CM

## 2024-11-19 PROCEDURE — G0008: CPT

## 2024-11-19 PROCEDURE — 90662 IIV NO PRSV INCREASED AG IM: CPT

## 2024-11-19 PROCEDURE — 99497 ADVNCD CARE PLAN 30 MIN: CPT

## 2024-11-19 PROCEDURE — 99214 OFFICE O/P EST MOD 30 MIN: CPT | Mod: 25

## 2024-11-19 PROCEDURE — G0439: CPT

## 2024-11-19 PROCEDURE — 36415 COLL VENOUS BLD VENIPUNCTURE: CPT

## 2024-11-19 PROCEDURE — 93000 ELECTROCARDIOGRAM COMPLETE: CPT

## 2024-11-20 LAB
ALBUMIN SERPL ELPH-MCNC: 4.6 G/DL
ALP BLD-CCNC: 168 U/L
ALT SERPL-CCNC: 38 U/L
ANION GAP SERPL CALC-SCNC: 17 MMOL/L
APPEARANCE: ABNORMAL
AST SERPL-CCNC: 33 U/L
BASOPHILS # BLD AUTO: 0.07 K/UL
BASOPHILS NFR BLD AUTO: 0.9 %
BILIRUB SERPL-MCNC: 0.3 MG/DL
BILIRUBIN URINE: NEGATIVE
BLOOD URINE: NEGATIVE
BUN SERPL-MCNC: 20 MG/DL
CALCIUM SERPL-MCNC: 11.5 MG/DL
CALCIUM SERPL-MCNC: 11.5 MG/DL
CHLORIDE SERPL-SCNC: 103 MMOL/L
CHOLEST SERPL-MCNC: 204 MG/DL
CO2 SERPL-SCNC: 23 MMOL/L
COLOR: YELLOW
CREAT SERPL-MCNC: 0.72 MG/DL
EGFR: 86 ML/MIN/1.73M2
EOSINOPHIL # BLD AUTO: 0.44 K/UL
EOSINOPHIL NFR BLD AUTO: 5.8 %
ESTIMATED AVERAGE GLUCOSE: 97 MG/DL
FOLATE SERPL-MCNC: 14.9 NG/ML
GLUCOSE QUALITATIVE U: NEGATIVE MG/DL
GLUCOSE SERPL-MCNC: 73 MG/DL
HBA1C MFR BLD HPLC: 5 %
HCT VFR BLD CALC: 43.5 %
HDLC SERPL-MCNC: 68 MG/DL
HGB BLD-MCNC: 13.8 G/DL
IMM GRANULOCYTES NFR BLD AUTO: 0.7 %
KETONES URINE: NEGATIVE MG/DL
LDLC SERPL CALC-MCNC: 116 MG/DL
LEUKOCYTE ESTERASE URINE: ABNORMAL
LYMPHOCYTES # BLD AUTO: 2.23 K/UL
LYMPHOCYTES NFR BLD AUTO: 29.3 %
MAGNESIUM SERPL-MCNC: 2.1 MG/DL
MAN DIFF?: NORMAL
MCHC RBC-ENTMCNC: 29.2 PG
MCHC RBC-ENTMCNC: 31.7 G/DL
MCV RBC AUTO: 92 FL
MONOCYTES # BLD AUTO: 0.64 K/UL
MONOCYTES NFR BLD AUTO: 8.4 %
NEUTROPHILS # BLD AUTO: 4.19 K/UL
NEUTROPHILS NFR BLD AUTO: 54.9 %
NITRITE URINE: NEGATIVE
NONHDLC SERPL-MCNC: 137 MG/DL
PARATHYROID HORMONE INTACT: 47 PG/ML
PH URINE: 7.5
PLATELET # BLD AUTO: 244 K/UL
POTASSIUM SERPL-SCNC: 4.3 MMOL/L
PROT SERPL-MCNC: 6.9 G/DL
PROTEIN URINE: NEGATIVE MG/DL
RBC # BLD: 4.73 M/UL
RBC # FLD: 13.4 %
SODIUM SERPL-SCNC: 143 MMOL/L
SPECIFIC GRAVITY URINE: 1.02
T4 FREE SERPL-MCNC: 1.4 NG/DL
T4 SERPL-MCNC: 9.5 UG/DL
TRIGL SERPL-MCNC: 117 MG/DL
TSH SERPL-ACNC: 0.85 UIU/ML
UROBILINOGEN URINE: 0.2 MG/DL
VIT B12 SERPL-MCNC: 596 PG/ML
WBC # FLD AUTO: 7.62 K/UL

## 2024-12-16 DIAGNOSIS — E83.52 HYPERCALCEMIA: ICD-10-CM

## 2024-12-16 DIAGNOSIS — R74.8 ABNORMAL LEVELS OF OTHER SERUM ENZYMES: ICD-10-CM

## 2025-03-21 ENCOUNTER — NON-APPOINTMENT (OUTPATIENT)
Age: 80
End: 2025-03-21

## 2025-05-05 ENCOUNTER — NON-APPOINTMENT (OUTPATIENT)
Age: 80
End: 2025-05-05

## 2025-05-07 ENCOUNTER — APPOINTMENT (OUTPATIENT)
Dept: INTERNAL MEDICINE | Facility: CLINIC | Age: 80
End: 2025-05-07
Payer: MEDICARE

## 2025-05-07 VITALS — DIASTOLIC BLOOD PRESSURE: 80 MMHG | SYSTOLIC BLOOD PRESSURE: 138 MMHG | RESPIRATION RATE: 14 BRPM | HEART RATE: 72 BPM

## 2025-05-07 DIAGNOSIS — K21.9 GASTRO-ESOPHAGEAL REFLUX DISEASE W/OUT ESOPHAGITIS: ICD-10-CM

## 2025-05-07 DIAGNOSIS — E83.52 HYPERCALCEMIA: ICD-10-CM

## 2025-05-07 DIAGNOSIS — R74.8 ABNORMAL LEVELS OF OTHER SERUM ENZYMES: ICD-10-CM

## 2025-05-07 DIAGNOSIS — R60.0 LOCALIZED EDEMA: ICD-10-CM

## 2025-05-07 DIAGNOSIS — I10 ESSENTIAL (PRIMARY) HYPERTENSION: ICD-10-CM

## 2025-05-07 PROCEDURE — 36415 COLL VENOUS BLD VENIPUNCTURE: CPT

## 2025-05-07 PROCEDURE — G2211 COMPLEX E/M VISIT ADD ON: CPT

## 2025-05-07 PROCEDURE — 99214 OFFICE O/P EST MOD 30 MIN: CPT

## 2025-05-07 RX ORDER — DULOXETINE HYDROCHLORIDE 20 MG/1
20 CAPSULE, DELAYED RELEASE PELLETS ORAL
Qty: 30 | Refills: 1 | Status: ACTIVE | COMMUNITY
Start: 2025-05-07 | End: 1900-01-01

## 2025-05-07 RX ORDER — PANTOPRAZOLE 40 MG/1
40 TABLET, DELAYED RELEASE ORAL DAILY
Qty: 90 | Refills: 1 | Status: ACTIVE | COMMUNITY
Start: 2025-05-07 | End: 1900-01-01

## 2025-05-08 LAB
ALBUMIN SERPL ELPH-MCNC: 4.7 G/DL
ALP BLD-CCNC: 128 U/L
ALT SERPL-CCNC: 33 U/L
ANION GAP SERPL CALC-SCNC: 15 MMOL/L
AST SERPL-CCNC: 29 U/L
BASOPHILS # BLD AUTO: 0.08 K/UL
BASOPHILS NFR BLD AUTO: 0.8 %
BILIRUB SERPL-MCNC: 0.2 MG/DL
BUN SERPL-MCNC: 22 MG/DL
CALCIUM SERPL-MCNC: 11.2 MG/DL
CALCIUM SERPL-MCNC: 11.2 MG/DL
CHLORIDE SERPL-SCNC: 101 MMOL/L
CHOLEST SERPL-MCNC: 210 MG/DL
CO2 SERPL-SCNC: 22 MMOL/L
CREAT SERPL-MCNC: 0.71 MG/DL
EGFRCR SERPLBLD CKD-EPI 2021: 87 ML/MIN/1.73M2
EOSINOPHIL # BLD AUTO: 0.35 K/UL
EOSINOPHIL NFR BLD AUTO: 3.5 %
GLUCOSE SERPL-MCNC: 55 MG/DL
HCT VFR BLD CALC: 41.5 %
HDLC SERPL-MCNC: 61 MG/DL
HGB BLD-MCNC: 13.5 G/DL
IMM GRANULOCYTES NFR BLD AUTO: 0.5 %
LDLC SERPL-MCNC: 121 MG/DL
LYMPHOCYTES # BLD AUTO: 2.36 K/UL
LYMPHOCYTES NFR BLD AUTO: 23.9 %
MAN DIFF?: NORMAL
MCHC RBC-ENTMCNC: 28.7 PG
MCHC RBC-ENTMCNC: 32.5 G/DL
MCV RBC AUTO: 88.3 FL
MONOCYTES # BLD AUTO: 0.86 K/UL
MONOCYTES NFR BLD AUTO: 8.7 %
NEUTROPHILS # BLD AUTO: 6.16 K/UL
NEUTROPHILS NFR BLD AUTO: 62.6 %
NONHDLC SERPL-MCNC: 148 MG/DL
PARATHYROID HORMONE INTACT: 68 PG/ML
PLATELET # BLD AUTO: 287 K/UL
POTASSIUM SERPL-SCNC: 4.2 MMOL/L
PROT SERPL-MCNC: 7.1 G/DL
RBC # BLD: 4.7 M/UL
RBC # FLD: 13 %
SODIUM SERPL-SCNC: 138 MMOL/L
TRIGL SERPL-MCNC: 157 MG/DL
TSH SERPL-ACNC: 0.79 UIU/ML
WBC # FLD AUTO: 9.86 K/UL

## 2025-05-11 LAB
ALP BLD-CCNC: 125 IU/L
ALP BONE CFR SERPL: 24 %
ALP INTEST CFR SERPL: 3 %
ALP LIVER CFR SERPL: 73 %

## 2025-05-30 ENCOUNTER — APPOINTMENT (OUTPATIENT)
Dept: ULTRASOUND IMAGING | Facility: CLINIC | Age: 80
End: 2025-05-30
Payer: MEDICARE

## 2025-05-30 ENCOUNTER — OUTPATIENT (OUTPATIENT)
Dept: OUTPATIENT SERVICES | Facility: HOSPITAL | Age: 80
LOS: 1 days | End: 2025-05-30
Payer: MEDICARE

## 2025-05-30 DIAGNOSIS — R74.8 ABNORMAL LEVELS OF OTHER SERUM ENZYMES: ICD-10-CM

## 2025-05-30 DIAGNOSIS — Z98.890 OTHER SPECIFIED POSTPROCEDURAL STATES: Chronic | ICD-10-CM

## 2025-05-30 DIAGNOSIS — Z98.89 OTHER SPECIFIED POSTPROCEDURAL STATES: Chronic | ICD-10-CM

## 2025-05-30 PROCEDURE — 76700 US EXAM ABDOM COMPLETE: CPT

## 2025-05-30 PROCEDURE — 76700 US EXAM ABDOM COMPLETE: CPT | Mod: 26

## 2025-06-25 ENCOUNTER — RX RENEWAL (OUTPATIENT)
Age: 80
End: 2025-06-25

## 2025-07-21 ENCOUNTER — RX RENEWAL (OUTPATIENT)
Age: 80
End: 2025-07-21

## 2025-07-30 ENCOUNTER — RX RENEWAL (OUTPATIENT)
Age: 80
End: 2025-07-30

## 2025-08-18 ENCOUNTER — RX RENEWAL (OUTPATIENT)
Age: 80
End: 2025-08-18

## 2025-09-03 ENCOUNTER — APPOINTMENT (OUTPATIENT)
Dept: INTERNAL MEDICINE | Facility: CLINIC | Age: 80
End: 2025-09-03

## 2025-09-03 VITALS — DIASTOLIC BLOOD PRESSURE: 78 MMHG | SYSTOLIC BLOOD PRESSURE: 138 MMHG | RESPIRATION RATE: 14 BRPM | HEART RATE: 72 BPM

## 2025-09-03 DIAGNOSIS — E83.52 HYPERCALCEMIA: ICD-10-CM

## 2025-09-03 DIAGNOSIS — N60.82 OTHER BENIGN MAMMARY DYSPLASIAS OF LEFT BREAST: ICD-10-CM

## 2025-09-03 DIAGNOSIS — F43.21 ADJUSTMENT DISORDER WITH DEPRESSED MOOD: ICD-10-CM

## 2025-09-03 DIAGNOSIS — I10 ESSENTIAL (PRIMARY) HYPERTENSION: ICD-10-CM

## 2025-09-03 DIAGNOSIS — R74.8 ABNORMAL LEVELS OF OTHER SERUM ENZYMES: ICD-10-CM

## 2025-09-03 RX ORDER — SERTRALINE 25 MG/1
25 TABLET, FILM COATED ORAL
Qty: 30 | Refills: 1 | Status: ACTIVE | COMMUNITY
Start: 2025-09-03 | End: 1900-01-01

## 2025-09-03 RX ORDER — AMOXICILLIN AND CLAVULANATE POTASSIUM 875; 125 MG/1; MG/1
875-125 TABLET, COATED ORAL
Qty: 20 | Refills: 0 | Status: ACTIVE | COMMUNITY
Start: 2025-09-03 | End: 1900-01-01

## 2025-09-04 LAB
ALBUMIN SERPL ELPH-MCNC: 4.5 G/DL
ALP BLD-CCNC: 181 U/L
ALT SERPL-CCNC: 51 U/L
ANION GAP SERPL CALC-SCNC: 13 MMOL/L
AST SERPL-CCNC: 38 U/L
BASOPHILS # BLD AUTO: 0.09 K/UL
BASOPHILS NFR BLD AUTO: 0.8 %
BILIRUB SERPL-MCNC: 0.2 MG/DL
BUN SERPL-MCNC: 20 MG/DL
CALCIUM SERPL-MCNC: 11 MG/DL
CALCIUM SERPL-MCNC: 11 MG/DL
CHLORIDE SERPL-SCNC: 103 MMOL/L
CHOLEST SERPL-MCNC: 182 MG/DL
CO2 SERPL-SCNC: 22 MMOL/L
CREAT SERPL-MCNC: 0.66 MG/DL
EGFRCR SERPLBLD CKD-EPI 2021: 89 ML/MIN/1.73M2
EOSINOPHIL # BLD AUTO: 0.38 K/UL
EOSINOPHIL NFR BLD AUTO: 3.3 %
GLUCOSE SERPL-MCNC: 67 MG/DL
HCT VFR BLD CALC: 42 %
HDLC SERPL-MCNC: 61 MG/DL
HGB BLD-MCNC: 13.1 G/DL
IMM GRANULOCYTES NFR BLD AUTO: 0.4 %
LDLC SERPL-MCNC: 93 MG/DL
LYMPHOCYTES # BLD AUTO: 2.58 K/UL
LYMPHOCYTES NFR BLD AUTO: 22.7 %
MAN DIFF?: NORMAL
MCHC RBC-ENTMCNC: 27.9 PG
MCHC RBC-ENTMCNC: 31.2 G/DL
MCV RBC AUTO: 89.4 FL
MONOCYTES # BLD AUTO: 0.92 K/UL
MONOCYTES NFR BLD AUTO: 8.1 %
NEUTROPHILS # BLD AUTO: 7.33 K/UL
NEUTROPHILS NFR BLD AUTO: 64.7 %
NONHDLC SERPL-MCNC: 121 MG/DL
PARATHYROID HORMONE INTACT: 105 PG/ML
PLATELET # BLD AUTO: 319 K/UL
POTASSIUM SERPL-SCNC: 4.4 MMOL/L
PROT SERPL-MCNC: 7.1 G/DL
RBC # BLD: 4.7 M/UL
RBC # FLD: 13.2 %
SODIUM SERPL-SCNC: 139 MMOL/L
TRIGL SERPL-MCNC: 162 MG/DL
WBC # FLD AUTO: 11.35 K/UL

## 2025-09-17 ENCOUNTER — APPOINTMENT (OUTPATIENT)
Dept: INTERNAL MEDICINE | Facility: CLINIC | Age: 80
End: 2025-09-17
Payer: MEDICARE

## 2025-09-17 VITALS — SYSTOLIC BLOOD PRESSURE: 138 MMHG | RESPIRATION RATE: 14 BRPM | DIASTOLIC BLOOD PRESSURE: 78 MMHG | HEART RATE: 72 BPM

## 2025-09-17 DIAGNOSIS — E83.52 HYPERCALCEMIA: ICD-10-CM

## 2025-09-17 DIAGNOSIS — I10 ESSENTIAL (PRIMARY) HYPERTENSION: ICD-10-CM

## 2025-09-17 DIAGNOSIS — N60.82 OTHER BENIGN MAMMARY DYSPLASIAS OF LEFT BREAST: ICD-10-CM

## 2025-09-17 PROCEDURE — G2211 COMPLEX E/M VISIT ADD ON: CPT

## 2025-09-17 PROCEDURE — 99214 OFFICE O/P EST MOD 30 MIN: CPT

## (undated) DEVICE — SUT SOFSILK 2-0 18" C-23

## (undated) DEVICE — SUT SILK 2-0 30" RB-1

## (undated) DEVICE — WARMING BLANKET UPPER ADULT

## (undated) DEVICE — ELCTR BOVIE TIP BLADE INSULATED 2.75" EDGE

## (undated) DEVICE — DRAPE MAYO STAND 30"

## (undated) DEVICE — DRAPE C ARM UNIVERSAL

## (undated) DEVICE — DRAPE EQUIPMENT COVER 27"

## (undated) DEVICE — VENODYNE/SCD SLEEVE CALF LARGE

## (undated) DEVICE — DRAPE 3/4 SHEET W REINFORCEMENT 56X77"

## (undated) DEVICE — DRSG TELFA 3 X 8

## (undated) DEVICE — SUT BIOSYN 4-0 18" P-13

## (undated) DEVICE — SUT VICRYL 2-0 18" CP-2 UNDYED (POP-OFF)

## (undated) DEVICE — SOL IRR POUR H2O 250ML

## (undated) DEVICE — GLV 8 PROTEXIS (WHITE)

## (undated) DEVICE — MEDICATION LABELS W MARKER

## (undated) DEVICE — DRAPE 1/2 SHEET 40X57"

## (undated) DEVICE — MARKING PEN W RULER

## (undated) DEVICE — PACK VP SHUNT

## (undated) DEVICE — DRAPE TOWEL BLUE 17" X 24"

## (undated) DEVICE — GLV 7.5 PROTEXIS (WHITE)

## (undated) DEVICE — SPECIMEN CONTAINER 100ML

## (undated) DEVICE — DRSG DERMABOND 0.7ML

## (undated) DEVICE — DRAPE CAMERA VIDEO 7"X96"

## (undated) DEVICE — SYR LUER LOK 20CC

## (undated) DEVICE — STAPLER SKIN VISI-STAT 35 WIDE

## (undated) DEVICE — CANISTER SUCTION 2000CC

## (undated) DEVICE — PREP CHLORAPREP HI-LITE ORANGE 26ML

## (undated) DEVICE — DRSG TEGADERM 4X4.75"

## (undated) DEVICE — SOL IRR POUR NS 0.9% 500ML

## (undated) DEVICE — BIPOLAR FORCEP SYMMETRY BAYONET 7" X 1.5MM SMOOTH (SILVER)

## (undated) DEVICE — NEUROSTIMULATOR EXTERNAL WIRELESS WENS

## (undated) DEVICE — KIT BOOT ACCESSORY